# Patient Record
Sex: MALE | Race: WHITE | NOT HISPANIC OR LATINO | URBAN - METROPOLITAN AREA
[De-identification: names, ages, dates, MRNs, and addresses within clinical notes are randomized per-mention and may not be internally consistent; named-entity substitution may affect disease eponyms.]

---

## 2018-08-16 VITALS
DIASTOLIC BLOOD PRESSURE: 71 MMHG | DIASTOLIC BLOOD PRESSURE: 76 MMHG | RESPIRATION RATE: 17 BRPM | HEART RATE: 70 BPM | HEART RATE: 67 BPM | RESPIRATION RATE: 14 BRPM | DIASTOLIC BLOOD PRESSURE: 69 MMHG | TEMPERATURE: 98.1 F | RESPIRATION RATE: 16 BRPM | DIASTOLIC BLOOD PRESSURE: 72 MMHG | DIASTOLIC BLOOD PRESSURE: 77 MMHG | WEIGHT: 245 LBS | RESPIRATION RATE: 21 BRPM | HEART RATE: 65 BPM | SYSTOLIC BLOOD PRESSURE: 134 MMHG | OXYGEN SATURATION: 93 % | HEART RATE: 71 BPM | OXYGEN SATURATION: 95 % | SYSTOLIC BLOOD PRESSURE: 107 MMHG | HEART RATE: 68 BPM | SYSTOLIC BLOOD PRESSURE: 111 MMHG | DIASTOLIC BLOOD PRESSURE: 82 MMHG | SYSTOLIC BLOOD PRESSURE: 120 MMHG | SYSTOLIC BLOOD PRESSURE: 150 MMHG | HEART RATE: 66 BPM | OXYGEN SATURATION: 94 % | SYSTOLIC BLOOD PRESSURE: 117 MMHG | HEART RATE: 77 BPM | HEIGHT: 73 IN | SYSTOLIC BLOOD PRESSURE: 123 MMHG | TEMPERATURE: 97.1 F | OXYGEN SATURATION: 96 %

## 2018-08-20 ENCOUNTER — AMBULATORY SURGICAL CENTER (OUTPATIENT)
Dept: URBAN - METROPOLITAN AREA SURGERY 17 | Facility: SURGERY | Age: 63
End: 2018-08-20
Payer: COMMERCIAL

## 2018-08-20 DIAGNOSIS — Z86.010 PERSONAL HISTORY OF COLONIC POLYPS: ICD-10-CM

## 2018-08-20 DIAGNOSIS — K57.30 DIVERTICULOSIS OF LARGE INTESTINE WITHOUT PERFORATION OR ABS: ICD-10-CM

## 2018-08-20 DIAGNOSIS — K64.1 SECOND DEGREE HEMORRHOIDS: ICD-10-CM

## 2018-08-20 PROCEDURE — 45378 DIAGNOSTIC COLONOSCOPY: CPT | Mod: 33 | Performed by: INTERNAL MEDICINE

## 2019-03-12 ENCOUNTER — LAB REQUISITION (OUTPATIENT)
Dept: LAB | Facility: HOSPITAL | Age: 64
End: 2019-03-12

## 2019-03-12 DIAGNOSIS — M67.441 GANGLION OF RIGHT HAND: ICD-10-CM

## 2019-03-12 PROCEDURE — 88305 TISSUE EXAM BY PATHOLOGIST: CPT | Performed by: PLASTIC SURGERY

## 2019-03-13 LAB
CYTO UR: NORMAL
LAB AP CASE REPORT: NORMAL
LAB AP CLINICAL INFORMATION: NORMAL
PATH REPORT.FINAL DX SPEC: NORMAL
PATH REPORT.GROSS SPEC: NORMAL

## 2022-06-21 ENCOUNTER — OFFICE VISIT (OUTPATIENT)
Dept: ORTHOPEDIC SURGERY | Facility: CLINIC | Age: 67
End: 2022-06-21

## 2022-06-21 VITALS — BODY MASS INDEX: 35.97 KG/M2 | WEIGHT: 265.6 LBS | TEMPERATURE: 97.5 F | HEIGHT: 72 IN

## 2022-06-21 DIAGNOSIS — M16.12 PRIMARY OSTEOARTHRITIS OF LEFT HIP: Primary | ICD-10-CM

## 2022-06-21 PROCEDURE — 99204 OFFICE O/P NEW MOD 45 MIN: CPT | Performed by: NURSE PRACTITIONER

## 2022-06-21 PROCEDURE — 73501 X-RAY EXAM HIP UNI 1 VIEW: CPT | Performed by: NURSE PRACTITIONER

## 2022-06-21 RX ORDER — RANITIDINE 150 MG/1
150 TABLET ORAL
COMMUNITY

## 2022-06-21 RX ORDER — BUPROPION HYDROCHLORIDE 150 MG/1
150 TABLET ORAL EVERY MORNING
COMMUNITY
Start: 2022-06-07

## 2022-06-21 RX ORDER — SODIUM PHOSPHATE,MONO-DIBASIC 19G-7G/118
ENEMA (ML) RECTAL
COMMUNITY

## 2022-06-21 RX ORDER — MELOXICAM 15 MG/1
TABLET ORAL
COMMUNITY
Start: 2022-06-11

## 2022-06-21 RX ORDER — ESCITALOPRAM OXALATE 20 MG/1
20 TABLET ORAL DAILY
COMMUNITY
Start: 2022-04-27

## 2022-06-21 RX ORDER — OXYCODONE AND ACETAMINOPHEN 10; 325 MG/1; MG/1
1 TABLET ORAL EVERY 6 HOURS PRN
COMMUNITY
Start: 2022-06-06

## 2022-06-21 RX ORDER — BUDESONIDE AND FORMOTEROL FUMARATE DIHYDRATE 160; 4.5 UG/1; UG/1
2 AEROSOL RESPIRATORY (INHALATION) 2 TIMES DAILY
COMMUNITY
Start: 2022-05-20

## 2022-06-21 RX ORDER — CYCLOBENZAPRINE HCL 10 MG
TABLET ORAL
COMMUNITY
Start: 2022-03-27

## 2022-06-21 RX ORDER — ALPRAZOLAM 1 MG/1
TABLET ORAL
COMMUNITY
Start: 2022-06-19

## 2022-06-21 RX ORDER — TERBINAFINE HYDROCHLORIDE 250 MG/1
250 TABLET ORAL DAILY
COMMUNITY
Start: 2022-03-16

## 2022-06-21 RX ORDER — CETIRIZINE HYDROCHLORIDE 10 MG/1
10 TABLET ORAL DAILY
COMMUNITY

## 2022-06-21 NOTE — PROGRESS NOTES
Patient: Mario Osullivan Jr.  YOB: 1955 66 y.o. male  Medical Record Number: [id number]    Chief Complaints:   Chief Complaint   Patient presents with   • Left Hip - Initial Evaluation       History of Present Illness:Mario Osullivan Jr. is a 66 y.o. male who presents with complaints of left hip pain.  Patient was seen previously by Dr. Alexandre approximately 5 years ago.  At that time he had pain in his left hip, some moderate arthritic changes and was sent for a left hip fluoroscopy guided cortisone injection.  That actually helped significantly and he only started with increased pain about 4 to 5 months ago.  He denies any injury.  He thinks he may be walking with a limp.  He contacted his medical doctor and they started him meloxicam which has seemed to help somewhat.  Pain is primarily left groin and he is having a difficult time getting shoes and socks on.    Allergies: No Known Allergies    Medications:   Current Outpatient Medications   Medication Sig Dispense Refill   • ALPRAZolam (XANAX) 1 MG tablet      • buPROPion XL (WELLBUTRIN XL) 150 MG 24 hr tablet Take 150 mg by mouth Every Morning.     • cyclobenzaprine (FLEXERIL) 10 MG tablet TAKE 1 TABLET BY MOUTH TWICE A DAY AS NEEDED FOR SPASMS     • glucosamine-chondroitin 500-400 MG capsule capsule Take  by mouth 3 (Three) Times a Day With Meals.     • meloxicam (MOBIC) 15 MG tablet TAKE 1/2 TO 1 TABLET BY MOUTH DAILY AS NEEDED FOR PAIN     • oxyCODONE-acetaminophen (PERCOCET)  MG per tablet Take 1 tablet by mouth Every 6 (Six) Hours As Needed. for pain     • cetirizine (zyrTEC) 10 MG tablet Take 10 mg by mouth Daily.     • escitalopram (LEXAPRO) 20 MG tablet Take 20 mg by mouth Daily.     • raNITIdine (ZANTAC) 150 MG tablet Take 150 mg by mouth.     • Symbicort 160-4.5 MCG/ACT inhaler Inhale 2 puffs 2 (Two) Times a Day.     • terbinafine (lamiSIL) 250 MG tablet Take 250 mg by mouth Daily.       No current facility-administered medications  "for this visit.         The following portions of the patient's history were reviewed and updated as appropriate: allergies, current medications, past family history, past medical history, past social history, past surgical history and problem list.    Review of Systems:   A 14 point review of systems was performed. All systems negative except pertinent positives/negative listed in HPI above    Physical Exam:   Vitals:    06/21/22 0854   Temp: 97.5 °F (36.4 °C)   TempSrc: Temporal   Weight: 120 kg (265 lb 9.6 oz)   Height: 182.9 cm (72\")       General: A and O x 3, ASA, NAD    SCLERA:    Normal    Skin clear no unusual lesions noted  Left hip patient is nontender palpation he has pain with internal and external rotation with a positive Stinchfield positive logroll calf is soft and nontender       Radiology:  Xrays 2 views of left hip ordered and reviewed today secondary to pain and show bone-on-bone end-stage osteoarthritis with cyst and spur formation.  Compared to views show definite progression in arthritic changes    Assessment/Plan: End-stage osteoarthritis left hip with increasing pain    Patient discussed options, Dr. Alexandre also saw the patient.  We will proceed with a left hip fluoroscopy guided cortisone injection to see if that helps provide some relief, continue meloxicam, and the patient will follow-up with me if the pain does not improve or if it worsens.  At some point he most likely will want to proceed with total hip replacement we will start with the injection first      Dai Dotson, APRN  6/21/2022  "

## 2022-07-06 ENCOUNTER — HOSPITAL ENCOUNTER (OUTPATIENT)
Dept: GENERAL RADIOLOGY | Facility: HOSPITAL | Age: 67
Discharge: HOME | End: 2022-07-06
Admitting: NURSE PRACTITIONER

## 2022-07-06 DIAGNOSIS — M16.12 PRIMARY OSTEOARTHRITIS OF LEFT HIP: ICD-10-CM

## 2022-07-06 PROCEDURE — 77002 NEEDLE LOCALIZATION BY XRAY: CPT

## 2022-07-06 PROCEDURE — 25010000002 IOPAMIDOL 61 % SOLUTION: Performed by: RADIOLOGY

## 2022-07-06 PROCEDURE — 0 LIDOCAINE 1 % SOLUTION: Performed by: RADIOLOGY

## 2022-07-06 PROCEDURE — 25010000002 METHYLPREDNISOLONE PER 40 MG: Performed by: RADIOLOGY

## 2022-07-06 RX ORDER — METHYLPREDNISOLONE ACETATE 40 MG/ML
40 INJECTION, SUSPENSION INTRA-ARTICULAR; INTRALESIONAL; INTRAMUSCULAR; SOFT TISSUE ONCE
Status: COMPLETED | OUTPATIENT
Start: 2022-07-06 | End: 2022-07-06

## 2022-07-06 RX ORDER — BUPIVACAINE HYDROCHLORIDE 2.5 MG/ML
10 INJECTION, SOLUTION EPIDURAL; INFILTRATION; INTRACAUDAL ONCE
Status: COMPLETED | OUTPATIENT
Start: 2022-07-06 | End: 2022-07-06

## 2022-07-06 RX ORDER — LIDOCAINE HYDROCHLORIDE 10 MG/ML
10 INJECTION, SOLUTION INFILTRATION; PERINEURAL ONCE
Status: COMPLETED | OUTPATIENT
Start: 2022-07-06 | End: 2022-07-06

## 2022-07-06 RX ADMIN — IOPAMIDOL 1 ML: 612 INJECTION, SOLUTION INTRATHECAL at 13:35

## 2022-07-06 RX ADMIN — METHYLPREDNISOLONE ACETATE 80 MG: 40 INJECTION, SUSPENSION INTRA-ARTICULAR; INTRALESIONAL; INTRAMUSCULAR; SOFT TISSUE at 13:35

## 2022-07-06 RX ADMIN — LIDOCAINE HYDROCHLORIDE 3 ML: 10 INJECTION, SOLUTION INFILTRATION; PERINEURAL at 13:35

## 2022-07-06 RX ADMIN — BUPIVACAINE HYDROCHLORIDE 5 ML: 2.5 INJECTION, SOLUTION EPIDURAL; INFILTRATION; INTRACAUDAL; PERINEURAL at 13:35

## 2023-02-07 ENCOUNTER — OFFICE VISIT (OUTPATIENT)
Dept: ORTHOPEDIC SURGERY | Facility: CLINIC | Age: 68
End: 2023-02-07
Payer: MEDICARE

## 2023-02-07 VITALS — WEIGHT: 259.5 LBS | BODY MASS INDEX: 35.15 KG/M2 | HEIGHT: 72 IN | TEMPERATURE: 96.9 F

## 2023-02-07 DIAGNOSIS — M16.12 PRIMARY OSTEOARTHRITIS OF LEFT HIP: Primary | ICD-10-CM

## 2023-02-07 PROCEDURE — 99213 OFFICE O/P EST LOW 20 MIN: CPT | Performed by: ORTHOPAEDIC SURGERY

## 2023-02-07 NOTE — PROGRESS NOTES
Patient: Mario Osullivan Jr.  YOB: 1955 67 y.o. male  Medical Record Number: [id number]    Chief Complaint:   Chief Complaint   Patient presents with   • Left Hip - Follow-up       History of Present Illness:Mario Osullivan Jr. is a 67 y.o. male who presents for follow-up of intermittent left hip pain he has an ache within the groin he still is fairly active but there are certain positions or times when the hip causes a sharp stabbing pain with a general baseline ache all the time.  Overall its been relatively stable in comparison to 6 months ago    Allergies: No Known Allergies    Medications:   Current Outpatient Medications   Medication Sig Dispense Refill   • ALPRAZolam (XANAX) 1 MG tablet      • buPROPion XL (WELLBUTRIN XL) 150 MG 24 hr tablet Take 150 mg by mouth Every Morning.     • cyclobenzaprine (FLEXERIL) 10 MG tablet TAKE 1 TABLET BY MOUTH TWICE A DAY AS NEEDED FOR SPASMS     • meloxicam (MOBIC) 15 MG tablet TAKE 1/2 TO 1 TABLET BY MOUTH DAILY AS NEEDED FOR PAIN     • oxyCODONE-acetaminophen (PERCOCET)  MG per tablet Take 1 tablet by mouth Every 6 (Six) Hours As Needed. for pain     • raNITIdine (ZANTAC) 150 MG tablet Take 150 mg by mouth.     • cetirizine (zyrTEC) 10 MG tablet Take 10 mg by mouth Daily.     • escitalopram (LEXAPRO) 20 MG tablet Take 20 mg by mouth Daily.     • glucosamine-chondroitin 500-400 MG capsule capsule Take  by mouth 3 (Three) Times a Day With Meals.     • Symbicort 160-4.5 MCG/ACT inhaler Inhale 2 puffs 2 (Two) Times a Day.     • terbinafine (lamiSIL) 250 MG tablet Take 250 mg by mouth Daily.       No current facility-administered medications for this visit.         The following portions of the patient's history were reviewed and updated as appropriate: allergies, current medications, past family history, past medical history, past social history, past surgical history and problem list.    Review of Systems:   A 14 point review of systems was performed. All  "systems negative except pertinent positives/negative listed in HPI above    Physical Exam:   Vitals:    02/07/23 0753   Temp: 96.9 °F (36.1 °C)   Weight: 118 kg (259 lb 8 oz)   Height: 182.9 cm (72\")       General: A and O x 3, ASA, NAD    SCLERA:    Normal    DENTITION:   Normal  Hip:  left    LEG ALIGNMENT:     Neutral   ,    equal leg lengths    GAIT:     Nonantalgic    SKIN:     No abnormality    RANGE OF MOTION:      Full without joint irritability    STRENGTH:     5 / 5    hip flexion and abduction    DISTAL PULSES:    Paplable    DISTAL SENSATION :   Intact    LYMPHATICS:     No   lymphadenopathy    OTHER:          - Negative Stinchfeld test      - Negative log roll      - No Tenderness to palpation trochanteric bursa      - Neg FADIR      - Neg CHYNA      - No SI tenderness     Radiology:    Xrays 2views left hip (AP bilateral hips and lateral hip) taken previously demonstrating moderate joint space narrowing with near bone-on-bone articulation and marginal osteophyte formation      Assessment/Plan:  Left hip osteoarthritis relatively stable versus previous exam.  At this point we will continue close observation.  I would not recommend any further injections given his failure to respond to previous injections he will come back in 6 months with repeat x-rays sooner should his pain worsen.  We did have some discussions about treatment which would be hip replacement I discussed the procedure in detail expected recovery.  At this point he is not ready for that.      Hussain Alexandre MD  2/7/2023  "

## 2023-08-08 ENCOUNTER — OFFICE VISIT (OUTPATIENT)
Dept: ORTHOPEDIC SURGERY | Facility: CLINIC | Age: 68
End: 2023-08-08
Payer: MEDICARE

## 2023-08-08 VITALS — WEIGHT: 264.2 LBS | BODY MASS INDEX: 35.78 KG/M2 | TEMPERATURE: 98.9 F | HEIGHT: 72 IN

## 2023-08-08 DIAGNOSIS — R52 PAIN: Primary | ICD-10-CM

## 2023-08-08 PROCEDURE — 99212 OFFICE O/P EST SF 10 MIN: CPT | Performed by: ORTHOPAEDIC SURGERY

## 2024-06-11 ENCOUNTER — OFFICE VISIT (OUTPATIENT)
Dept: ORTHOPEDIC SURGERY | Facility: CLINIC | Age: 69
End: 2024-06-11
Payer: MEDICARE

## 2024-06-11 VITALS — HEIGHT: 73 IN | WEIGHT: 252.4 LBS | TEMPERATURE: 98.7 F | BODY MASS INDEX: 33.45 KG/M2

## 2024-06-11 DIAGNOSIS — M16.12 PRIMARY OSTEOARTHRITIS OF LEFT HIP: Primary | ICD-10-CM

## 2024-06-11 PROCEDURE — 99213 OFFICE O/P EST LOW 20 MIN: CPT | Performed by: NURSE PRACTITIONER

## 2024-06-11 NOTE — PROGRESS NOTES
Patient: Mario Osullivan Jr.  YOB: 1955 68 y.o. male  Medical Record Number: [id number]    Chief Complaint:   Chief Complaint   Patient presents with    Left Hip - Follow-up       History of Present Illness:Mario Osullivan Jr. is a 68 y.o. male who presents for follow-up of left hip pain that is chronic in nature.  Patient has known advanced bone-on-bone hip arthritis that we have been closely monitoring.  Patient was last seen approximately 6 months ago.  He states overall he had been managing his symptoms well until about 3 weeks ago when he had a acute flareup while at work.  Reports his pain got pretty severe and limited his ability to function.  States as of today he has returned back to his baseline.  He denied any injury during this time.  Reports that he takes meloxicam daily for his pain.  Patient reports previously having 2 fluoroscopy guided intra-articular joint injections were the first 1 helped and the second 1 only lasted 2 weeks.  Patient states that he is here today for reevaluation.    Allergies: No Known Allergies    Medications:   Current Outpatient Medications   Medication Sig Dispense Refill    ALPRAZolam (XANAX) 1 MG tablet       buPROPion XL (WELLBUTRIN XL) 150 MG 24 hr tablet Take 1 tablet by mouth Every Morning.      cetirizine (zyrTEC) 10 MG tablet Take 1 tablet by mouth Daily.      cyclobenzaprine (FLEXERIL) 10 MG tablet       escitalopram (LEXAPRO) 20 MG tablet Take 1 tablet by mouth Daily.      glucosamine-chondroitin 500-400 MG capsule capsule Take  by mouth 3 (Three) Times a Day With Meals.      meloxicam (MOBIC) 15 MG tablet TAKE 1/2 TO 1 TABLET BY MOUTH DAILY AS NEEDED FOR PAIN      oxyCODONE-acetaminophen (PERCOCET)  MG per tablet Take 1 tablet by mouth Every 6 (Six) Hours As Needed. for pain      raNITIdine (ZANTAC) 150 MG tablet Take 1 tablet by mouth.      Symbicort 160-4.5 MCG/ACT inhaler Inhale 2 puffs 2 (Two) Times a Day.      terbinafine (lamiSIL) 250 MG  "tablet Take 1 tablet by mouth Daily.       No current facility-administered medications for this visit.         The following portions of the patient's history were reviewed and updated as appropriate: allergies, current medications, past family history, past medical history, past social history, past surgical history and problem list.    Review of Systems:   Pertinent positives/negative listed in HPI above    Physical Exam:   Vitals:    06/11/24 0808   Temp: 98.7 °F (37.1 °C)   TempSrc: Temporal   Weight: 114 kg (252 lb 6.4 oz)   Height: 184.2 cm (72.5\")   PainSc:   3   PainLoc: Hip       General: A and O x 3, ASA, NAD      Hip:  left    LEG ALIGNMENT:     Neutral        LEG LENGTH DISCREPANCY   :    none    GAIT:     Antalgic    SKIN:     No abnormality    RANGE OF MOTION: Slightly irritable with internal rotation    STRENGTH:     5/5    DISTAL PULSES:    Paplable    DISTAL SENSATION :   Intact    LYMPHATICS:     No   lymphadenopathy    OTHER:          -   Stinchfeld test      - Scouring      + FADIR      - CHYNA      -    log roll      -   Tenderness to palpation trochanteric bursa      Radiology:    Xrays 2views left hip (AP bilateral hips and lateral hip) taken previously demonstrating advanced, end-satge osteoarthritis with bone on bone articluation, periarticular osteophytes, and subchondral cysts  Comparison views: No new films were taken today for comparison view    Assessment/Plan: Primary osteoarthritis left hip    Treatment options as well as imaging results were discussed in detail with the patient.  We did discuss continuation of conservative versus surgical measures.  Patient reports that he is back at his baseline and symptoms are manageable again.  I have instructed him to continue taking the meloxicam on a daily basis.  If his symptoms flareup again and remain consistent he will likely need to proceed forward with a total hip replacement at that time.  I have instructed him when this occurs he " needs to follow back up with Dr. Alexandre to discuss further surgical details.    Jj Soriano, APRN  6/11/2024

## 2024-11-20 ENCOUNTER — HOSPITAL ENCOUNTER (EMERGENCY)
Facility: HOSPITAL | Age: 69
Discharge: HOME | End: 2024-11-20
Attending: EMERGENCY MEDICINE
Payer: MEDICARE

## 2024-11-20 VITALS
OXYGEN SATURATION: 97 % | HEART RATE: 73 BPM | DIASTOLIC BLOOD PRESSURE: 95 MMHG | TEMPERATURE: 97.7 F | SYSTOLIC BLOOD PRESSURE: 154 MMHG | RESPIRATION RATE: 16 BRPM

## 2024-11-20 DIAGNOSIS — S01.01XA SCALP LACERATION, INITIAL ENCOUNTER: Primary | ICD-10-CM

## 2024-11-20 PROCEDURE — 90471 IMMUNIZATION ADMIN: CPT | Performed by: EMERGENCY MEDICINE

## 2024-11-20 PROCEDURE — 90715 TDAP VACCINE 7 YRS/> IM: CPT | Performed by: EMERGENCY MEDICINE

## 2024-11-20 PROCEDURE — 99283 EMERGENCY DEPT VISIT LOW MDM: CPT

## 2024-11-20 PROCEDURE — 25010000002 LIDOCAINE 1% - EPINEPHRINE 1:100000 1 %-1:100000 SOLUTION: Performed by: EMERGENCY MEDICINE

## 2024-11-20 PROCEDURE — 25010000002 TETANUS-DIPHTH-ACELL PERTUSSIS 5-2.5-18.5 LF-MCG/0.5 SUSPENSION PREFILLED SYRINGE: Performed by: EMERGENCY MEDICINE

## 2024-11-20 RX ORDER — LIDOCAINE HYDROCHLORIDE AND EPINEPHRINE 10; 10 MG/ML; UG/ML
10 INJECTION, SOLUTION INFILTRATION; PERINEURAL ONCE
Status: COMPLETED | OUTPATIENT
Start: 2024-11-20 | End: 2024-11-20

## 2024-11-20 RX ADMIN — TETANUS TOXOID, REDUCED DIPHTHERIA TOXOID AND ACELLULAR PERTUSSIS VACCINE, ADSORBED 0.5 ML: 5; 2.5; 8; 8; 2.5 SUSPENSION INTRAMUSCULAR at 13:26

## 2024-11-20 RX ADMIN — LIDOCAINE HYDROCHLORIDE AND EPINEPHRINE 10 ML: 10; 10 INJECTION, SOLUTION INFILTRATION; PERINEURAL at 14:00

## 2024-11-20 NOTE — ED NOTES
"Pt arrived to ER via EMS from home, c/o of laceration on top of head due to accidentally hitting his head on a door frame. Pt denies loc but states \"he saw stars\", denies blood thinners.  "

## 2024-11-20 NOTE — ED PROVIDER NOTES
Laceration Repair    Date/Time: 11/20/2024 2:02 PM    Performed by: Ada Barrios PA-C  Authorized by: Soren Laura MD    Consent:     Consent obtained:  Verbal    Consent given by:  Patient    Risks, benefits, and alternatives were discussed: yes      Risks discussed:  Infection, pain, poor cosmetic result and poor wound healing    Alternatives discussed:  No treatment  Anesthesia:     Anesthesia method:  Local infiltration    Local anesthetic:  Lidocaine 1% WITH epi  Laceration details:     Location:  Scalp    Scalp location:  Crown    Length (cm):  3  Pre-procedure details:     Preparation:  Patient was prepped and draped in usual sterile fashion  Exploration:     Hemostasis achieved with:  Epinephrine and direct pressure    Wound exploration: entire depth of wound visualized      Wound extent: no fascia violation noted, no foreign bodies/material noted, no muscle damage noted, no nerve damage noted, no underlying fracture noted and no vascular damage noted      Contaminated: no    Treatment:     Area cleansed with:  Chlorhexidine    Amount of cleaning:  Standard    Irrigation solution:  Sterile water    Irrigation method:  Syringe  Skin repair:     Repair method:  Staples    Number of staples:  3  Approximation:     Approximation:  Close  Repair type:     Repair type:  Simple  Post-procedure details:     Dressing:  Open (no dressing)    Procedure completion:  Tolerated well, no immediate complications         Ada Barrios PA-C  11/20/24 1403

## 2024-11-20 NOTE — ED PROVIDER NOTES
EMERGENCY DEPARTMENT ENCOUNTER    Room Number:  25/25  PCP: Von Barroso DO  Historian: Patient      HPI:  Chief Complaint: Scalp laceration  A complete HPI/ROS/PMH/PSH/SH/FH are unobtainable due to: None  Context: Mario Osullivan Jr. is a 69 y.o. male who presents to the ED c/o scalp laceration.  Patient states he was renovating a house.  Patient states walked forward and hit his head on low hanging area.  Did not lose consciousness.  Has had no headache.  Has had no fevers or chills.  Has had no vomiting.  No vision changes.  Patient is not on blood thinners.  Has no other injuries.            PAST MEDICAL HISTORY  Active Ambulatory Problems     Diagnosis Date Noted    Hypersomnia      Resolved Ambulatory Problems     Diagnosis Date Noted    No Resolved Ambulatory Problems     No Additional Past Medical History         PAST SURGICAL HISTORY  Past Surgical History:   Procedure Laterality Date    CERVICAL POLYPECTOMY           FAMILY HISTORY  Family History   Problem Relation Age of Onset    Cancer Mother     Cancer Father          SOCIAL HISTORY  Social History     Socioeconomic History    Marital status:    Tobacco Use    Smoking status: Former     Types: Cigarettes    Smokeless tobacco: Never   Vaping Use    Vaping status: Never Used   Substance and Sexual Activity    Alcohol use: Yes     Comment: rare    Drug use: Never    Sexual activity: Defer         ALLERGIES  Patient has no known allergies.        REVIEW OF SYSTEMS  Review of Systems   Scalp laceration      PHYSICAL EXAM  ED Triage Vitals [11/20/24 1309]   Temp Heart Rate Resp BP SpO2   97.7 °F (36.5 °C) 76 16 154/82 92 %      Temp src Heart Rate Source Patient Position BP Location FiO2 (%)   Oral -- Lying Right arm --       Physical Exam      GENERAL: no acute distress  HENT: nares patent  EYES: no scleral icterus  CV: regular rhythm, normal rate  RESPIRATORY: normal effort  ABDOMEN: soft  MUSCULOSKELETAL: no deformity  NEURO: alert,  moves all extremities, follows commands  PSYCH:  calm, cooperative  SKIN: warm, dry.  2 cm laceration to scalp    Vital signs and nursing notes reviewed.          LAB RESULTS  No results found for this or any previous visit (from the past 24 hours).          RADIOLOGY  No Radiology Exams Resulted Within Past 24 Hours            PROCEDURES  Procedures          MEDICATIONS GIVEN IN ER  Medications   Tetanus-Diphth-Acell Pertussis (BOOSTRIX) injection 0.5 mL (0.5 mL Intramuscular Given 11/20/24 1326)   lidocaine 1% - EPINEPHrine 1:501088 (XYLOCAINE W/EPI) 1 %-1:847533 injection 10 mL (10 mL Injection Given 11/20/24 1400)                   MEDICAL DECISION MAKING, PROGRESS, and CONSULTS    All labs have been independently reviewed by me.  All radiology studies have been reviewed by me and I have also reviewed the radiology report.   EKG's independently viewed and interpreted by me.  Discussion below represents my analysis of pertinent findings related to patient's condition, differential diagnosis, treatment plan and final disposition.      Additional sources:  - Discussed/ obtained information from independent historians: None    - External (non-ED) record review: Epic reviewed patient seen by orthopedist 6/11/2024 for osteoarthritis of left hip     - Chronic or social conditions impacting care: None    - Shared decision making: None      Orders placed during this visit:  Orders Placed This Encounter   Procedures    Laceration Repair         Additional orders considered but not ordered:  None        Differential diagnosis includes but is not limited to:    Laceration versus contusion versus intracranial hemorrhage      Independent interpretation of labs, radiology studies, and discussions with consultants:  ED Course as of 11/20/24 1441   Wed Nov 20, 2024   1405 14:06 EST  Patient presents for evaluation of scalp laceration.  Patient has no signs of head injury.  No loss of conscious.  No nausea or vomiting.  No vision  changes.  Patient's lacerations been repaired.  Patient will be discharged home follow-up with primary provider. [SL]      ED Course User Index  [SL] Soren Laura MD                 DIAGNOSIS  Final diagnoses:   Scalp laceration, initial encounter         DISPOSITION  DISCHARGE    Patient discharged in stable condition.    Reviewed implications of results, diagnosis, meds, responsibility to follow up, warning signs and symptoms of possible worsening, potential complications and reasons to return to ER, including worsening symptoms    Patient/Family voiced understanding of above instructions.    Discussed plan for discharge, as there is no emergent indication for admission. Patient referred to primary care provider for BP management due to today's BP. Pt/family is agreeable and understands need for follow up and repeat testing.  Pt is aware that discharge does not mean that nothing is wrong but it indicates no emergency is present that requires admission and they must continue care with follow-up as given below or physician of their choice.     FOLLOW-UP  Von Barroso,   [address] [place] [address] [address] [place] [address] [phone]    In 1 week  for staple removal         Medication List      No changes were made to your prescriptions during this visit.                  Latest Documented Vital Signs:  As of 14:41 EST  BP- 154/95 HR- 73 Temp- 97.7 °F (36.5 °C) (Oral) O2 sat- 97%              --    Please note that portions of this were completed with a voice recognition program.       Note Disclaimer: At [facility], we believe that sharing information builds trust and better relationships. You are receiving this note because you are receiving care at [facility] or recently visited. It is possible you will see health information before a provider has talked with you about it. This kind of information can be easy to misunderstand. To help you fully understand what it means for your health, we urge you to  discuss this note with your provider.            Soren Laura MD  11/20/24 3250

## 2024-11-20 NOTE — ED NOTES
Pt states that he was walking through short doorway when he ran into the frame. Denies loc or blood thinners. Pt has laceration to top of head. Bleeding controlled. No dizziness or vision changes

## 2024-11-21 ENCOUNTER — OFFICE VISIT (OUTPATIENT)
Dept: ORTHOPEDIC SURGERY | Facility: CLINIC | Age: 69
End: 2024-11-21
Payer: MEDICARE

## 2024-11-21 VITALS — HEIGHT: 72 IN | TEMPERATURE: 98.4 F | BODY MASS INDEX: 34.95 KG/M2 | WEIGHT: 258 LBS

## 2024-11-21 DIAGNOSIS — M16.12 PRIMARY OSTEOARTHRITIS OF LEFT HIP: ICD-10-CM

## 2024-11-21 DIAGNOSIS — R52 PAIN: Primary | ICD-10-CM

## 2024-11-21 PROBLEM — M17.9 OA (OSTEOARTHRITIS) OF KNEE: Status: ACTIVE | Noted: 2024-11-21

## 2024-11-21 PROCEDURE — 99214 OFFICE O/P EST MOD 30 MIN: CPT | Performed by: ORTHOPAEDIC SURGERY

## 2024-11-21 RX ORDER — PREGABALIN 150 MG/1
150 CAPSULE ORAL ONCE
OUTPATIENT
Start: 2024-11-21 | End: 2024-11-21

## 2024-11-21 RX ORDER — CHLORHEXIDINE GLUCONATE 500 MG/1
CLOTH TOPICAL 2 TIMES DAILY
OUTPATIENT
Start: 2024-11-21

## 2024-11-21 NOTE — PROGRESS NOTES
Patient: Mario Osullivan Jr.  YOB: 1955 69 y.o. male  Medical Record Number: [id number]    Chief Complaint:   Chief Complaint   Patient presents with    Left Hip - Follow-up, Pain       History of Present Illness:Mario Osullivan Jr. is a 69 y.o. male who presents for follow-up of left hip pain.  He has had progressive increase in the left hip pain.  He had a couple different fluoroscopy guided injections the second 1 did not help nearly as much as the first.  The pain now limits basic activities of daily living and walking tolerance.  He can only walk short distances due to the pain.  It is not improved with anti-inflammatories.  It has progressively worsened over the last few years despite conservative measures.    Allergies: No Known Allergies    Medications:   Current Outpatient Medications   Medication Sig Dispense Refill    ALPRAZolam (XANAX) 1 MG tablet       buPROPion XL (WELLBUTRIN XL) 150 MG 24 hr tablet Take 1 tablet by mouth Every Morning.      cetirizine (zyrTEC) 10 MG tablet Take 1 tablet by mouth Daily.      meloxicam (MOBIC) 15 MG tablet TAKE 1/2 TO 1 TABLET BY MOUTH DAILY AS NEEDED FOR PAIN      oxyCODONE-acetaminophen (PERCOCET)  MG per tablet Take 1 tablet by mouth Every 6 (Six) Hours As Needed. for pain      raNITIdine (ZANTAC) 150 MG tablet Take 1 tablet by mouth.      terbinafine (lamiSIL) 250 MG tablet Take 1 tablet by mouth Daily.      cyclobenzaprine (FLEXERIL) 10 MG tablet  (Patient not taking: Reported on 11/21/2024)      escitalopram (LEXAPRO) 20 MG tablet Take 1 tablet by mouth Daily. (Patient not taking: Reported on 11/21/2024)      glucosamine-chondroitin 500-400 MG capsule capsule Take  by mouth 3 (Three) Times a Day With Meals. (Patient not taking: Reported on 11/21/2024)      Symbicort 160-4.5 MCG/ACT inhaler Inhale 2 puffs 2 (Two) Times a Day. (Patient not taking: Reported on 11/21/2024)       No current facility-administered medications for this visit.  "        The following portions of the patient's history were reviewed and updated as appropriate: allergies, current medications, past family history, past medical history, past social history, past surgical history and problem list.    Review of Systems:   Pertinent positives/negative listed in HPI above    Physical Exam:   Vitals:    11/21/24 1013   Temp: 98.4 °F (36.9 °C)   TempSrc: Temporal   Weight: 117 kg (258 lb)   Height: 182.9 cm (72\")   PainSc:   4   PainLoc: Hip       General: A and O x 3, ASA, NAD      Hip:  left    LEG ALIGNMENT:     Neutral        LEG LENGTH DISCREPANCY   :    none    GAIT:     Antalgic    SKIN:     No abnormality    RANGE OF MOTION:     Limited by joint irritability    STRENGTH:     Limited by joint irratibility    DISTAL PULSES:    Paplable    DISTAL SENSATION :   Intact    LYMPHATICS:     No   lymphadenopathy    OTHER:          +   Stinchfeld test      -    log roll      -   Tenderness to palpation trochanteric bursa      Radiology:    Xrays 2views left hip  (AP bilateral hips and lateral hip) were ordered and reviewed for evaluation of hip pain demonstrating advanced, end-satge osteoarthritis with bone on bone articluation, periarticular osteophytes, and subchondral cysts  Comparison views: todays xrays were compared to previous xrays and demonstrate no change    Assessment/Plan:  Left hip endstage OA  Continuation of conservative management vs. PAKO discussed.  The patient wishes to proceed with total hip replacement.  At this point the patient has failed the full gamut of conservative treatment and stating complete understanding of the risks/benefits/ anternatives wishes to proceed with surgical treatment.    Risk and benefits of surgery were reviewed.  Including, but not limited to, blood clots, anesthesia risk, infection, leg length discrepancy, fracture, skin/leg numbness, failure of the implant, need for future surgeries, continued pain, hematoma, need for transfusion, and " death, among others.  The patient understands and wishes to proceed.     The spectrum of treatment options were discussed with the patient in detail including both the nonoperative and operative treatment modalities and their respective risks and benefits.  After thorough discussion, the patient has elected to undergo surgical treatment.  The details of the surgical procedure were explained including the location of probable incisions and a description of the likely implants to be used.  Models and diagrams were used as educational resources. The patient understands the likely convalescence after surgery, as well as the rehabilitation required.  We thoroughly discussed the risks, benefits, and alternatives to surgery.  The risks include but are not limited to the risk of infection, joint stiffness, blood clots (including DVT and/or pulmonary embolus along with the risk of death), neurologic and/or vascular injury, fracture, dislocation, nonunion, malunion, need for further surgery including hardware failure requiring revision, and continued pain.  It was explained that if tissue has been repaired or reconstructed, there is also a chance of failure which may require further management.  Following the completion of the discussion, the patient expressed understanding of this planned course of care, all their questions were answered and consent will be obtained preoperatively.    Operative Plan: Anterior approach Total Hip Replacement - Outpatient     Diagnoses and all orders for this visit:    1. Pain (Primary)  -     XR Hip With or Without Pelvis 2 - 3 View Left        Hussain Alexandre MD  11/21/2024

## 2025-02-03 ENCOUNTER — PRE-ADMISSION TESTING (OUTPATIENT)
Dept: PREADMISSION TESTING | Facility: HOSPITAL | Age: 70
End: 2025-02-03
Payer: MEDICARE

## 2025-02-03 VITALS
SYSTOLIC BLOOD PRESSURE: 140 MMHG | DIASTOLIC BLOOD PRESSURE: 79 MMHG | RESPIRATION RATE: 20 BRPM | BODY MASS INDEX: 34.54 KG/M2 | WEIGHT: 255 LBS | TEMPERATURE: 97.6 F | HEART RATE: 73 BPM | OXYGEN SATURATION: 95 % | HEIGHT: 72 IN

## 2025-02-03 LAB
ABO GROUP BLD: NORMAL
ANION GAP SERPL CALCULATED.3IONS-SCNC: 11 MMOL/L (ref 5–15)
BLD GP AB SCN SERPL QL: NEGATIVE
BUN SERPL-MCNC: 29 MG/DL (ref 8–23)
BUN/CREAT SERPL: 30.5 (ref 7–25)
CALCIUM SPEC-SCNC: 8.6 MG/DL (ref 8.6–10.5)
CHLORIDE SERPL-SCNC: 104 MMOL/L (ref 98–107)
CO2 SERPL-SCNC: 23 MMOL/L (ref 22–29)
CREAT SERPL-MCNC: 0.95 MG/DL (ref 0.76–1.27)
DEPRECATED RDW RBC AUTO: 42.8 FL (ref 37–54)
EGFRCR SERPLBLD CKD-EPI 2021: 86.6 ML/MIN/1.73
ERYTHROCYTE [DISTWIDTH] IN BLOOD BY AUTOMATED COUNT: 13.5 % (ref 12.3–15.4)
GLUCOSE SERPL-MCNC: 113 MG/DL (ref 65–99)
HCT VFR BLD AUTO: 47.9 % (ref 37.5–51)
HGB BLD-MCNC: 16.1 G/DL (ref 13–17.7)
MCH RBC QN AUTO: 29.5 PG (ref 26.6–33)
MCHC RBC AUTO-ENTMCNC: 33.6 G/DL (ref 31.5–35.7)
MCV RBC AUTO: 87.7 FL (ref 79–97)
PLATELET # BLD AUTO: 258 10*3/MM3 (ref 140–450)
PMV BLD AUTO: 8.7 FL (ref 6–12)
POTASSIUM SERPL-SCNC: 4.1 MMOL/L (ref 3.5–5.2)
QT INTERVAL: 405 MS
QTC INTERVAL: 434 MS
RBC # BLD AUTO: 5.46 10*6/MM3 (ref 4.14–5.8)
RH BLD: NEGATIVE
SODIUM SERPL-SCNC: 138 MMOL/L (ref 136–145)
T&S EXPIRATION DATE: NORMAL
WBC NRBC COR # BLD AUTO: 13.48 10*3/MM3 (ref 3.4–10.8)

## 2025-02-03 PROCEDURE — 86900 BLOOD TYPING SEROLOGIC ABO: CPT | Performed by: ORTHOPAEDIC SURGERY

## 2025-02-03 PROCEDURE — 80048 BASIC METABOLIC PNL TOTAL CA: CPT

## 2025-02-03 PROCEDURE — 86850 RBC ANTIBODY SCREEN: CPT | Performed by: ORTHOPAEDIC SURGERY

## 2025-02-03 PROCEDURE — 36415 COLL VENOUS BLD VENIPUNCTURE: CPT

## 2025-02-03 PROCEDURE — 93005 ELECTROCARDIOGRAM TRACING: CPT

## 2025-02-03 PROCEDURE — 86901 BLOOD TYPING SEROLOGIC RH(D): CPT | Performed by: ORTHOPAEDIC SURGERY

## 2025-02-03 PROCEDURE — 85027 COMPLETE CBC AUTOMATED: CPT

## 2025-02-03 RX ORDER — METHYLPREDNISOLONE 4 MG/1
1 TABLET ORAL 3 TIMES DAILY
COMMUNITY

## 2025-02-03 RX ORDER — CLARITHROMYCIN 500 MG/1
1 TABLET ORAL EVERY 12 HOURS SCHEDULED
COMMUNITY
Start: 2025-01-27

## 2025-02-03 NOTE — DISCHARGE INSTRUCTIONS
Take the following medications the morning of surgery:    WELLBUTRIN, XANAX    If you are on prescription narcotic pain medication to control your pain you may also take that medication the morning of surgery.      General Instructions:     Do not eat solid food after midnight the night before surgery.  Clear liquids day of surgery are allowed but must be stopped at least two hours before your hospital arrival time.       Allowed clear liquids      Water, sodas, and tea or coffee with no cream or milk added.       12 to 20 ounces of a clear liquid that contains carbohydrates is recommended.  If non-diabetic, have Gatorade or Powerade.  If diabetic, have G2 or Powerade Zero.     Do not have liquids red in color.  Do not consume chicken, beef, pork or vegetable broth or bouillon cubes of any variety as they are not considered clear liquids and are not allowed.      Infants may have breast milk up to four hours before surgery.  Infants drinking formula may drink formula up to six hours before surgery.   Patients who avoid smoking, chewing tobacco and alcohol for 4 weeks prior to surgery have a reduced risk of post-operative complications.  Quit smoking as many days before surgery as you can.  Do not smoke, use chewing tobacco or drink alcohol the day of surgery.   If applicable bring your C-PAP/ BI-PAP machine in with you to preop day of surgery.  Bring any papers given to you in the doctor’s office.  Wear clean comfortable clothes.  Do not wear contact lenses, false eyelashes or make-up.  Bring a case for your glasses.   Bring crutches or walker if applicable.  Remove all piercings.  Leave jewelry and any other valuables at home.  Hair extensions with metal clips must be removed prior to surgery.  The Pre-Admission Testing nurse will instruct you to bring medications if unable to obtain an accurate list in Pre-Admission Testing.        If you were given a blood bank ID arm band remember to bring it with you the day of  surgery.    Preventing a Surgical Site Infection:  For 2 to 3 days before surgery, avoid shaving with a razor because the razor can irritate skin and make it easier to develop an infection.    Any areas of open skin can increase the risk of a post-operative wound infection by allowing bacteria to enter and travel throughout the body.  Notify your surgeon if you have any skin wounds / rashes even if it is not near the expected surgical site.  The area will need assessed to determine if surgery should be delayed until it is healed.  The night prior to surgery shower using a fresh bar of anti-bacterial soap (such as Dial) and clean washcloth.  Sleep in a clean bed with clean clothing.  Do not allow pets to sleep with you.  Shower on the morning of surgery using a fresh bar of anti-bacterial soap (such as Dial) and clean washcloth.  Dry with a clean towel and dress in clean clothing.  Ask your surgeon if you will be receiving antibiotics prior to surgery.  Make sure you, your family, and all healthcare providers clean their hands with soap and water or an alcohol based hand  before caring for you or your wound.    Day of surgery:  Your arrival time is approximately two hours before your scheduled surgery time.  Please note if you have an early arrival time the surgery doors do not open before 5:00 AM.  Upon arrival, a Pre-op nurse and Anesthesiologist will review your health history, obtain vital signs, and answer questions you may have.  The only belongings needed at this time will be a list of your home medications and if applicable your C-PAP/BI-PAP machine.  A Pre-op nurse will start an IV and you may receive medication in preparation for surgery, including something to help you relax.     Please be aware that surgery does come with discomfort.  We want to make every effort to control your discomfort so please discuss any uncontrolled symptoms with your nurse.   Your doctor will most likely have prescribed  pain medications.      If you are going home after surgery you will receive individualized written care instructions before being discharged.  A responsible adult must drive you to and from the hospital on the day of your surgery and ideally stay with you through the night.   .  Discharge prescriptions can be filled by the hospital pharmacy during regular pharmacy hours.  If you are having surgery late in the day/evening your prescription may be e-prescribed to your pharmacy.  Please verify your pharmacy hours or chose a 24 hour pharmacy to avoid not having access to your prescription because your pharmacy has closed for the day.    If you are staying overnight following surgery, you will be transported to your hospital room following the recovery period.  [facility] has all private rooms.    If you have any questions please call Pre-Admission Testing at [phone].  Deductibles and co-payments are collected on the day of service. Please be prepared to pay the required co-pay, deductible or deposit on the day of service as defined by your plan.    Call your surgeon immediately if you experience any of the following symptoms:  Sore Throat  Shortness of Breath or difficulty breathing  Cough  Chills  Body soreness or muscle pain  Headache  Fever  New loss of taste or smell  Do not arrive for your surgery ill.  Your procedure will need to be rescheduled to another time.  You will need to call your physician before the day of surgery to avoid any unnecessary exposure to hospital staff as well as other patients.      CHLORHEXIDINE CLOTH INSTRUCTIONS  The morning of surgery follow these instructions using the Chlorhexidine cloths you've been given.  These steps reduce bacteria on the body.  Do not use the cloths near your eyes, ears mouth, genitalia or on open wounds.  Throw the cloths away after use but do not try to flush them down a toilet.      Open and remove one cloth at a time from the package.     Leave the cloth unfolded and begin the bathing.  Massage the skin with the cloths using gentle pressure to remove bacteria.  Do not scrub harshly.   Follow the steps below with one 2% CHG cloth per area (6 total cloths).  One cloth for neck, shoulders and chest.  One cloth for both arms, hands, fingers and underarms (do underarms last).  One cloth for the abdomen followed by groin.  One cloth for right leg and foot including between the toes.  One cloth for left leg and foot including between the toes.  The last cloth is to be used for the back of the neck, back and buttocks.    Allow the CHG to air dry 3 minutes on the skin which will give it time to work and decrease the chance of irritation.  The skin may feel sticky until it is dry.  Do not rinse with water or any other liquid or you will lose the beneficial effects of the CHG.  If mild skin irritation occurs, do rinse the skin to remove the CHG.  Report this to the nurse at time of admission.  Do not apply lotions, creams, ointments, deodorants or perfumes after using the clothes. Dress in clean clothes before coming to the hospital.

## 2025-02-06 ENCOUNTER — OFFICE VISIT (OUTPATIENT)
Dept: ORTHOPEDIC SURGERY | Facility: CLINIC | Age: 70
End: 2025-02-06
Payer: MEDICARE

## 2025-02-06 VITALS
BODY MASS INDEX: 35.35 KG/M2 | WEIGHT: 261 LBS | TEMPERATURE: 98 F | HEIGHT: 72 IN | SYSTOLIC BLOOD PRESSURE: 152 MMHG | DIASTOLIC BLOOD PRESSURE: 77 MMHG

## 2025-02-06 DIAGNOSIS — M16.12 PRIMARY OSTEOARTHRITIS OF LEFT HIP: Primary | ICD-10-CM

## 2025-02-06 PROCEDURE — 1160F RVW MEDS BY RX/DR IN RCRD: CPT | Performed by: NURSE PRACTITIONER

## 2025-02-06 PROCEDURE — 1159F MED LIST DOCD IN RCRD: CPT | Performed by: NURSE PRACTITIONER

## 2025-02-06 PROCEDURE — S0260 H&P FOR SURGERY: HCPCS | Performed by: NURSE PRACTITIONER

## 2025-02-06 NOTE — H&P (VIEW-ONLY)
Patient: Mario Osullivan Jr.    Date of Admission: 2/17/2025    YOB: 1955    Medical Record Number: [id number]    Admitting Physician: Dr. Hussain Alexandre    Reason for Admission: End Stage Left Hip OA    History of Present Illness: 69 y.o. male presents with severe end stage hip osteoarthritis which has not been responsive to the full compliment of conservative measures. Despite conservative attempts, there is still severe, constant activity limiting hip pain. Given the severity of the pain, the patient has elected to proceed with hip replacement.    Allergies: No Known Allergies      Current Medications:  Home Medications:    Current Outpatient Medications on File Prior to Visit   Medication Sig    ALPRAZolam (XANAX) 1 MG tablet Take 1 tablet by mouth 2 (Two) Times a Day.    buPROPion XL (WELLBUTRIN XL) 150 MG 24 hr tablet Take 1 tablet by mouth Every Morning.    meloxicam (MOBIC) 15 MG tablet Take 1 tablet by mouth Daily. HOLD FOR SURGERY    oxyCODONE-acetaminophen (PERCOCET)  MG per tablet Take 1 tablet by mouth Every 6 (Six) Hours As Needed. for pain    raNITIdine (ZANTAC) 150 MG tablet Take 1 tablet by mouth As Needed.    Sildenafil Citrate (VIAGRA PO) Take  by mouth As Needed. HOLD 72 HOURS PRIOR TO SURGERY    clarithromycin (BIAXIN) 500 MG tablet Take 1 tablet by mouth Every 12 (Twelve) Hours. CURRENTLY TAKING FOR BRONCHITIS (Patient not taking: Reported on 2/6/2025)    methylPREDNISolone (MEDROL) 4 MG tablet Take 1 tablet by mouth 3 times a day. CURRENTLY TAKING FOR BRONCHITIS (Patient not taking: Reported on 2/6/2025)     No current facility-administered medications on file prior to visit.     PRN Meds:.    PMH:  Past Medical History:   Diagnosis Date    Anxiety and depression     Bronchitis 02/03/2025    RECENTLY TREATED FOR THIS    GERD (gastroesophageal reflux disease)     History of sepsis 2010    OA (osteoarthritis) of hip     LEFT:  PAIN, LIMITED MOBILITY    Sleep apnea     CPAP       "  PSURGH:  Past Surgical History:   Procedure Laterality Date    COLONOSCOPY      CYST REMOVAL      STATES SEVERAL NON CANCEROUS \"TUMORS\" REMOVED    ENDOSCOPY      HEMORRHOIDECTOMY      LASIK Bilateral     PROSTATE BIOPSY         SocialHx:  Social History     Occupational History    Not on file   Tobacco Use    Smoking status: Former     Types: Cigarettes     Passive exposure: Past    Smokeless tobacco: Never    Tobacco comments:     QUIT 40 YEARS AGO   Vaping Use    Vaping status: Never Used   Substance and Sexual Activity    Alcohol use: Yes     Comment: rare    Drug use: Never    Sexual activity: Defer      Social History     Social History Narrative    Not on file       FamHx:  Family History   Problem Relation Age of Onset    Cancer Mother     Cancer Father     Malig Hyperthermia Neg Hx          Review of Systems:   A 14 point review of systems was performed, pertinent positives discussed above, all other systems are negative    Physical Exam: 69 y.o. male  Vital Signs :   Vitals:    02/06/25 1617   BP: 152/77   Temp: 98 °F (36.7 °C)   Weight: 118 kg (261 lb)   Height: 182.9 cm (72\")   PainSc:   5   PainLoc: Hip     General: Alert and Oriented x 3, No acute distress.  Psych: mood and affect appropriate; recent and remote memory intact  Eyes: conjunctivae clear; pupils equally round and reactive, sclerae antiicteric  CV: no peripheral edema  Resp: normal respiratory effort  Skin: no rashes or wounds; normal turgor  Musculosketetal; pain with hip range of motion. Positive Stinchfeld test. No trochanteric tenderness.  Vascular: palpable distal pulses    Labs:    Pre-Admission Testing on 02/03/2025   Component Date Value Ref Range Status    ABO Type 02/03/2025 O   Final    RH type 02/03/2025 Negative   Final    Antibody Screen 02/03/2025 Negative   Final    T&S Expiration Date 02/03/2025 2/17/2025 11:59:00 PM   Final    Glucose 02/03/2025 113 (H)  65 - 99 mg/dL Final    BUN 02/03/2025 29 (H)  8 - 23 mg/dL Final    " Creatinine 02/03/2025 0.95  0.76 - 1.27 mg/dL Final    Sodium 02/03/2025 138  136 - 145 mmol/L Final    Potassium 02/03/2025 4.1  3.5 - 5.2 mmol/L Final    Chloride 02/03/2025 104  98 - 107 mmol/L Final    CO2 02/03/2025 23.0  22.0 - 29.0 mmol/L Final    Calcium 02/03/2025 8.6  8.6 - 10.5 mg/dL Final    BUN/Creatinine Ratio 02/03/2025 30.5 (H)  7.0 - 25.0 Final    Anion Gap 02/03/2025 11.0  5.0 - 15.0 mmol/L Final    eGFR 02/03/2025 86.6  >60.0 mL/min/1.73 Final    WBC 02/03/2025 13.48 (H)  3.40 - 10.80 10*3/mm3 Final    RBC 02/03/2025 5.46  4.14 - 5.80 10*6/mm3 Final    Hemoglobin 02/03/2025 16.1  13.0 - 17.7 g/dL Final    Hematocrit 02/03/2025 47.9  37.5 - 51.0 % Final    MCV 02/03/2025 87.7  79.0 - 97.0 fL Final    MCH 02/03/2025 29.5  26.6 - 33.0 pg Final    MCHC 02/03/2025 33.6  31.5 - 35.7 g/dL Final    RDW 02/03/2025 13.5  12.3 - 15.4 % Final    RDW-SD 02/03/2025 42.8  37.0 - 54.0 fl Final    MPV 02/03/2025 8.7  6.0 - 12.0 fL Final    Platelets 02/03/2025 258  140 - 450 10*3/mm3 Final    QT Interval 02/03/2025 405  ms Final    QTC Interval 02/03/2025 434  ms Final     Xrays:  Xrays AP pelvis and a lateral of the Left hip were reviewed demonstrating  End stage hip OA with bone on bone articulation, subchondral cysts and periarticular osteophytes.    Assessment:  End-stage Left hip osteoarthritis. Conservative measures have failed.      Plan:  The plan is to proceed with Left Total Hip Replacement. The patient voiced understanding of the risks, benefits, and alternative forms of treatment that were discussed with Dr Alexandre at the time of scheduling.  Same day home health, antibiotics for 10 days postoperatively because of history of infection, preadmission testing his white blood cell count was slightly elevated but he was being treated for bronchitis, that has completely resolved and he has no further infection at this time    Dai Dotson, APRN  2/6/2025

## 2025-02-06 NOTE — H&P
Patient: Mario Osullivan Jr.    Date of Admission: 2/17/2025    YOB: 1955    Medical Record Number: [id number]    Admitting Physician: Dr. Hussain Alexandre    Reason for Admission: End Stage Left Hip OA    History of Present Illness: 69 y.o. male presents with severe end stage hip osteoarthritis which has not been responsive to the full compliment of conservative measures. Despite conservative attempts, there is still severe, constant activity limiting hip pain. Given the severity of the pain, the patient has elected to proceed with hip replacement.    Allergies: No Known Allergies      Current Medications:  Home Medications:    Current Outpatient Medications on File Prior to Visit   Medication Sig    ALPRAZolam (XANAX) 1 MG tablet Take 1 tablet by mouth 2 (Two) Times a Day.    buPROPion XL (WELLBUTRIN XL) 150 MG 24 hr tablet Take 1 tablet by mouth Every Morning.    meloxicam (MOBIC) 15 MG tablet Take 1 tablet by mouth Daily. HOLD FOR SURGERY    oxyCODONE-acetaminophen (PERCOCET)  MG per tablet Take 1 tablet by mouth Every 6 (Six) Hours As Needed. for pain    raNITIdine (ZANTAC) 150 MG tablet Take 1 tablet by mouth As Needed.    Sildenafil Citrate (VIAGRA PO) Take  by mouth As Needed. HOLD 72 HOURS PRIOR TO SURGERY    clarithromycin (BIAXIN) 500 MG tablet Take 1 tablet by mouth Every 12 (Twelve) Hours. CURRENTLY TAKING FOR BRONCHITIS (Patient not taking: Reported on 2/6/2025)    methylPREDNISolone (MEDROL) 4 MG tablet Take 1 tablet by mouth 3 times a day. CURRENTLY TAKING FOR BRONCHITIS (Patient not taking: Reported on 2/6/2025)     No current facility-administered medications on file prior to visit.     PRN Meds:.    PMH:  Past Medical History:   Diagnosis Date    Anxiety and depression     Bronchitis 02/03/2025    RECENTLY TREATED FOR THIS    GERD (gastroesophageal reflux disease)     History of sepsis 2010    OA (osteoarthritis) of hip     LEFT:  PAIN, LIMITED MOBILITY    Sleep apnea     CPAP       "  PSURGH:  Past Surgical History:   Procedure Laterality Date    COLONOSCOPY      CYST REMOVAL      STATES SEVERAL NON CANCEROUS \"TUMORS\" REMOVED    ENDOSCOPY      HEMORRHOIDECTOMY      LASIK Bilateral     PROSTATE BIOPSY         SocialHx:  Social History     Occupational History    Not on file   Tobacco Use    Smoking status: Former     Types: Cigarettes     Passive exposure: Past    Smokeless tobacco: Never    Tobacco comments:     QUIT 40 YEARS AGO   Vaping Use    Vaping status: Never Used   Substance and Sexual Activity    Alcohol use: Yes     Comment: rare    Drug use: Never    Sexual activity: Defer      Social History     Social History Narrative    Not on file       FamHx:  Family History   Problem Relation Age of Onset    Cancer Mother     Cancer Father     Malig Hyperthermia Neg Hx          Review of Systems:   A 14 point review of systems was performed, pertinent positives discussed above, all other systems are negative    Physical Exam: 69 y.o. male  Vital Signs :   Vitals:    02/06/25 1617   BP: 152/77   Temp: 98 °F (36.7 °C)   Weight: 118 kg (261 lb)   Height: 182.9 cm (72\")   PainSc:   5   PainLoc: Hip     General: Alert and Oriented x 3, No acute distress.  Psych: mood and affect appropriate; recent and remote memory intact  Eyes: conjunctivae clear; pupils equally round and reactive, sclerae antiicteric  CV: no peripheral edema  Resp: normal respiratory effort  Skin: no rashes or wounds; normal turgor  Musculosketetal; pain with hip range of motion. Positive Stinchfeld test. No trochanteric tenderness.  Vascular: palpable distal pulses    Labs:    Pre-Admission Testing on 02/03/2025   Component Date Value Ref Range Status    ABO Type 02/03/2025 O   Final    RH type 02/03/2025 Negative   Final    Antibody Screen 02/03/2025 Negative   Final    T&S Expiration Date 02/03/2025 2/17/2025 11:59:00 PM   Final    Glucose 02/03/2025 113 (H)  65 - 99 mg/dL Final    BUN 02/03/2025 29 (H)  8 - 23 mg/dL Final    " Creatinine 02/03/2025 0.95  0.76 - 1.27 mg/dL Final    Sodium 02/03/2025 138  136 - 145 mmol/L Final    Potassium 02/03/2025 4.1  3.5 - 5.2 mmol/L Final    Chloride 02/03/2025 104  98 - 107 mmol/L Final    CO2 02/03/2025 23.0  22.0 - 29.0 mmol/L Final    Calcium 02/03/2025 8.6  8.6 - 10.5 mg/dL Final    BUN/Creatinine Ratio 02/03/2025 30.5 (H)  7.0 - 25.0 Final    Anion Gap 02/03/2025 11.0  5.0 - 15.0 mmol/L Final    eGFR 02/03/2025 86.6  >60.0 mL/min/1.73 Final    WBC 02/03/2025 13.48 (H)  3.40 - 10.80 10*3/mm3 Final    RBC 02/03/2025 5.46  4.14 - 5.80 10*6/mm3 Final    Hemoglobin 02/03/2025 16.1  13.0 - 17.7 g/dL Final    Hematocrit 02/03/2025 47.9  37.5 - 51.0 % Final    MCV 02/03/2025 87.7  79.0 - 97.0 fL Final    MCH 02/03/2025 29.5  26.6 - 33.0 pg Final    MCHC 02/03/2025 33.6  31.5 - 35.7 g/dL Final    RDW 02/03/2025 13.5  12.3 - 15.4 % Final    RDW-SD 02/03/2025 42.8  37.0 - 54.0 fl Final    MPV 02/03/2025 8.7  6.0 - 12.0 fL Final    Platelets 02/03/2025 258  140 - 450 10*3/mm3 Final    QT Interval 02/03/2025 405  ms Final    QTC Interval 02/03/2025 434  ms Final     Xrays:  Xrays AP pelvis and a lateral of the Left hip were reviewed demonstrating  End stage hip OA with bone on bone articulation, subchondral cysts and periarticular osteophytes.    Assessment:  End-stage Left hip osteoarthritis. Conservative measures have failed.      Plan:  The plan is to proceed with Left Total Hip Replacement. The patient voiced understanding of the risks, benefits, and alternative forms of treatment that were discussed with Dr Alexandre at the time of scheduling.  Same day home health, antibiotics for 10 days postoperatively because of history of infection, preadmission testing his white blood cell count was slightly elevated but he was being treated for bronchitis, that has completely resolved and he has no further infection at this time    Dai Dotson, APRN  2/6/2025

## 2025-02-10 ENCOUNTER — TELEPHONE (OUTPATIENT)
Dept: ORTHOPEDIC SURGERY | Facility: HOSPITAL | Age: 70
End: 2025-02-10
Payer: MEDICARE

## 2025-02-10 NOTE — TELEPHONE ENCOUNTER
Risk Factor yes no   Age >75  X   BMI <20 >40  X   Patient History     Chronic Pain (2 or more meds/Pain Management) X    ETOH (more than 3 drinks Daily)  X   Uncontrolled Depression/Bipolar/Schizoaffective Disorder  X   Arrhythmias--  X   Stent placement/MI  X   DVT/PE  X   Pacemaker  X   HTN (uncontrolled or requiring more than 2 medications)  X   CHF/Retained fluids/Edema  X   Stroke with Residual   X   COPD/Asthma  X   EMMY--Non-compliant with CPAP  X   Diabetes (on insulin or more than 2 meds)         A1C:  X   BPH/Urinary retention (on medication)  X   CKD  X   Home environment and support     Current ambulation status (use of cane, walker, W/C, Multiple falls/weakness)  X   Stairs to enter and throughout home  X   Lives Alone X    Doesn't have support at home  X   Outpatient Screening Assessment    Home needs: (Walker/BSC):  Needs walker  ? Steps 1 step   Does want to practice before leaving   Caregiver 24-48hrs post-discharge: daughter is going to stay with him    Discharge Plan:    PT    Prescriptions: Meds to bed    Home medications:   [] Blood thinner/anti-coag therapy--   [] BPH or diuretic--  [] BP meds--   ? Pain/Anti-inflammatories--Mobic, Percocet    Pre-op Education:  Educate patient on spinal anesthesia/pain control:  ? patient verbalize understanding    Educate patient on hospital course/timeline:  ?  patient verbalize understanding    Joint Care Class:  ?  yes [] no  Notes:   WBC's 13.48

## 2025-02-12 ENCOUNTER — TELEPHONE (OUTPATIENT)
Dept: ORTHOPEDIC SURGERY | Facility: CLINIC | Age: 70
End: 2025-02-12
Payer: MEDICARE

## 2025-02-12 NOTE — TELEPHONE ENCOUNTER
Patient called to report the he woke up in the middle of the night with chills and was running a fever of 101. He hasn't taken any medicine, but is currently fever-free. He states he has a little congestion. Surgery date is 2/17/25

## 2025-02-13 NOTE — TELEPHONE ENCOUNTER
Spoke to patient and relayed message from Dr. Alexandre. Patient stated he has some nasal congestion today, no fever in over 24 hours. I will call him tomorrow for an update on illness.

## 2025-02-14 ENCOUNTER — TELEPHONE (OUTPATIENT)
Dept: ORTHOPEDIC SURGERY | Facility: CLINIC | Age: 70
End: 2025-02-14
Payer: MEDICARE

## 2025-02-14 NOTE — TELEPHONE ENCOUNTER
Patient has been contacted regarding his recent illness.  He is feeling better and has been afebrile for the last 24 hours.  He is requesting stay overnight in the hospital after surgery since his daughter who is planning to take care of him has been recently ill and will not be there on day of surgery.  Has not forwarded to [facility]

## 2025-02-14 NOTE — TELEPHONE ENCOUNTER
Spoke to patient this morning. He reports no fever in over 48 hours. He has some drainage and a cough, no other symptoms. Surgery is scheduled for Monday, 2/17/25.

## 2025-02-17 ENCOUNTER — HOSPITAL ENCOUNTER (OUTPATIENT)
Facility: HOSPITAL | Age: 70
Discharge: HOME HEALTH | End: 2025-02-17
Attending: ORTHOPAEDIC SURGERY | Admitting: ORTHOPAEDIC SURGERY
Payer: MEDICARE

## 2025-02-17 ENCOUNTER — APPOINTMENT (OUTPATIENT)
Dept: GENERAL RADIOLOGY | Facility: HOSPITAL | Age: 70
End: 2025-02-17
Payer: MEDICARE

## 2025-02-17 ENCOUNTER — ANESTHESIA (OUTPATIENT)
Dept: PERIOP | Facility: HOSPITAL | Age: 70
End: 2025-02-17
Payer: MEDICARE

## 2025-02-17 ENCOUNTER — ANESTHESIA EVENT (OUTPATIENT)
Dept: PERIOP | Facility: HOSPITAL | Age: 70
End: 2025-02-17
Payer: MEDICARE

## 2025-02-17 VITALS
RESPIRATION RATE: 18 BRPM | SYSTOLIC BLOOD PRESSURE: 136 MMHG | HEART RATE: 78 BPM | OXYGEN SATURATION: 99 % | DIASTOLIC BLOOD PRESSURE: 79 MMHG | TEMPERATURE: 98 F

## 2025-02-17 DIAGNOSIS — Z96.642 STATUS POST TOTAL HIP REPLACEMENT, LEFT: Primary | ICD-10-CM

## 2025-02-17 DIAGNOSIS — M16.12 PRIMARY OSTEOARTHRITIS OF LEFT HIP: ICD-10-CM

## 2025-02-17 PROCEDURE — 25010000002 MIDAZOLAM PER 1 MG: Performed by: ANESTHESIOLOGY

## 2025-02-17 PROCEDURE — 25010000002 PROPOFOL 10 MG/ML EMULSION

## 2025-02-17 PROCEDURE — C1776 JOINT DEVICE (IMPLANTABLE): HCPCS | Performed by: ORTHOPAEDIC SURGERY

## 2025-02-17 PROCEDURE — 25010000002 SUGAMMADEX 200 MG/2ML SOLUTION

## 2025-02-17 PROCEDURE — 25010000002 ONDANSETRON PER 1 MG

## 2025-02-17 PROCEDURE — 27130 TOTAL HIP ARTHROPLASTY: CPT | Performed by: ORTHOPAEDIC SURGERY

## 2025-02-17 PROCEDURE — 25010000002 FENTANYL CITRATE (PF) 100 MCG/2ML SOLUTION

## 2025-02-17 PROCEDURE — 25010000002 CEFAZOLIN PER 500 MG: Performed by: ORTHOPAEDIC SURGERY

## 2025-02-17 PROCEDURE — 25010000002 ACETAMINOPHEN 10 MG/ML SOLUTION

## 2025-02-17 PROCEDURE — 25010000002 VANCOMYCIN 10 G RECONSTITUTED SOLUTION: Performed by: ORTHOPAEDIC SURGERY

## 2025-02-17 PROCEDURE — 25810000003 LACTATED RINGERS PER 1000 ML: Performed by: ANESTHESIOLOGY

## 2025-02-17 PROCEDURE — 25010000002 MORPHINE PER 10 MG: Performed by: ORTHOPAEDIC SURGERY

## 2025-02-17 PROCEDURE — 25810000003 SODIUM CHLORIDE 0.9 % SOLUTION: Performed by: ORTHOPAEDIC SURGERY

## 2025-02-17 PROCEDURE — 25010000002 LIDOCAINE PF 2% 2 % SOLUTION

## 2025-02-17 PROCEDURE — 25010000002 DEXAMETHASONE SODIUM PHOSPHATE 20 MG/5ML SOLUTION

## 2025-02-17 PROCEDURE — 25010000002 ROPIVACAINE PER 1 MG: Performed by: ORTHOPAEDIC SURGERY

## 2025-02-17 PROCEDURE — 97161 PT EVAL LOW COMPLEX 20 MIN: CPT

## 2025-02-17 PROCEDURE — 76000 FLUOROSCOPY <1 HR PHYS/QHP: CPT

## 2025-02-17 PROCEDURE — 25010000002 EPINEPHRINE 1 MG/ML SOLUTION 30 ML VIAL: Performed by: ORTHOPAEDIC SURGERY

## 2025-02-17 PROCEDURE — 27130 TOTAL HIP ARTHROPLASTY: CPT | Performed by: NURSE PRACTITIONER

## 2025-02-17 PROCEDURE — 97110 THERAPEUTIC EXERCISES: CPT

## 2025-02-17 PROCEDURE — 25010000002 KETOROLAC TROMETHAMINE PER 15 MG: Performed by: ORTHOPAEDIC SURGERY

## 2025-02-17 PROCEDURE — C1713 ANCHOR/SCREW BN/BN,TIS/BN: HCPCS | Performed by: ORTHOPAEDIC SURGERY

## 2025-02-17 PROCEDURE — 73501 X-RAY EXAM HIP UNI 1 VIEW: CPT

## 2025-02-17 PROCEDURE — 25010000002 LIDOCAINE PF 1% 1 % SOLUTION: Performed by: ANESTHESIOLOGY

## 2025-02-17 PROCEDURE — 25010000002 PROPOFOL 200 MG/20ML EMULSION

## 2025-02-17 PROCEDURE — 25810000003 LACTATED RINGERS SOLUTION: Performed by: ORTHOPAEDIC SURGERY

## 2025-02-17 PROCEDURE — 25010000002 HYDROMORPHONE PER 4 MG

## 2025-02-17 DEVICE — IMPLANTABLE DEVICE: Type: IMPLANTABLE DEVICE | Status: FUNCTIONAL

## 2025-02-17 DEVICE — POLARSTEM COLLAR STANDARD                                    NON-CEMENTED WITH TI/HA 2
Type: IMPLANTABLE DEVICE | Site: HIP | Status: FUNCTIONAL
Brand: POLARSTEM

## 2025-02-17 DEVICE — KNOTLESS TISSUE CONTROL DEVICE, VIOLET UNIDIRECTIONAL (ANTIBACTERIAL) SYNTHETIC ABSORBABLE DEVICE
Type: IMPLANTABLE DEVICE | Site: HIP | Status: FUNCTIONAL
Brand: STRATAFIX

## 2025-02-17 DEVICE — R3 0 DEGREE XLPE ACETABULAR LINER                                    36MM INNER DIAMETER X OUTER DIAMETER 56MM
Type: IMPLANTABLE DEVICE | Site: HIP | Status: FUNCTIONAL
Brand: R3

## 2025-02-17 DEVICE — OXINIUM FEMORAL HEAD 12/14 TAPER                                    36 MM +0
Type: IMPLANTABLE DEVICE | Site: HIP | Status: FUNCTIONAL
Brand: OXINIUM

## 2025-02-17 DEVICE — KNOTLESS TISSUE CONTROL DEVICE, UNDYED UNIDIRECTIONAL (ANTIBACTERIAL) SYNTHETIC ABSORBABLE DEVICE
Type: IMPLANTABLE DEVICE | Site: HIP | Status: FUNCTIONAL
Brand: STRATAFIX

## 2025-02-17 DEVICE — REFLECTION SPHERICAL HEAD SCREW 25MM
Type: IMPLANTABLE DEVICE | Site: HIP | Status: FUNCTIONAL
Brand: REFLECTION

## 2025-02-17 DEVICE — R3 3 HOLE ACETABULAR SHELL 56MM
Type: IMPLANTABLE DEVICE | Site: HIP | Status: FUNCTIONAL
Brand: R3 ACETABULAR

## 2025-02-17 DEVICE — REFLECTION SPHERICAL HEAD SCREW 20MM
Type: IMPLANTABLE DEVICE | Site: HIP | Status: FUNCTIONAL
Brand: REFLECTION

## 2025-02-17 RX ORDER — ACETAMINOPHEN 325 MG/1
650 TABLET ORAL EVERY 6 HOURS PRN
Status: DISCONTINUED | OUTPATIENT
Start: 2025-02-17 | End: 2025-02-17 | Stop reason: HOSPADM

## 2025-02-17 RX ORDER — POLYETHYLENE GLYCOL 3350 17 G/17G
17 POWDER, FOR SOLUTION ORAL 2 TIMES DAILY
Qty: 238 G | Refills: 0 | Status: SHIPPED | OUTPATIENT
Start: 2025-02-17 | End: 2025-02-24

## 2025-02-17 RX ORDER — ONDANSETRON 4 MG/1
4 TABLET, FILM COATED ORAL EVERY 8 HOURS PRN
Qty: 10 TABLET | Refills: 0 | Status: SHIPPED | OUTPATIENT
Start: 2025-02-17

## 2025-02-17 RX ORDER — LABETALOL HYDROCHLORIDE 5 MG/ML
5 INJECTION, SOLUTION INTRAVENOUS
Status: DISCONTINUED | OUTPATIENT
Start: 2025-02-17 | End: 2025-02-17 | Stop reason: HOSPADM

## 2025-02-17 RX ORDER — HYDROCODONE BITARTRATE AND ACETAMINOPHEN 5; 325 MG/1; MG/1
1 TABLET ORAL ONCE AS NEEDED
Status: DISCONTINUED | OUTPATIENT
Start: 2025-02-17 | End: 2025-02-17 | Stop reason: HOSPADM

## 2025-02-17 RX ORDER — FENTANYL CITRATE 50 UG/ML
50 INJECTION, SOLUTION INTRAMUSCULAR; INTRAVENOUS ONCE AS NEEDED
Status: DISCONTINUED | OUTPATIENT
Start: 2025-02-17 | End: 2025-02-17 | Stop reason: HOSPADM

## 2025-02-17 RX ORDER — SODIUM CHLORIDE 0.9 % (FLUSH) 0.9 %
3-10 SYRINGE (ML) INJECTION AS NEEDED
Status: DISCONTINUED | OUTPATIENT
Start: 2025-02-17 | End: 2025-02-17 | Stop reason: HOSPADM

## 2025-02-17 RX ORDER — SODIUM CHLORIDE 0.9 % (FLUSH) 0.9 %
3 SYRINGE (ML) INJECTION EVERY 12 HOURS SCHEDULED
Status: DISCONTINUED | OUTPATIENT
Start: 2025-02-17 | End: 2025-02-17 | Stop reason: HOSPADM

## 2025-02-17 RX ORDER — HYDROMORPHONE HYDROCHLORIDE 1 MG/ML
0.25 INJECTION, SOLUTION INTRAMUSCULAR; INTRAVENOUS; SUBCUTANEOUS
Status: DISCONTINUED | OUTPATIENT
Start: 2025-02-17 | End: 2025-02-17 | Stop reason: HOSPADM

## 2025-02-17 RX ORDER — PROPOFOL 10 MG/ML
INJECTION, EMULSION INTRAVENOUS AS NEEDED
Status: DISCONTINUED | OUTPATIENT
Start: 2025-02-17 | End: 2025-02-17 | Stop reason: SURG

## 2025-02-17 RX ORDER — HYDRALAZINE HYDROCHLORIDE 20 MG/ML
5 INJECTION INTRAMUSCULAR; INTRAVENOUS
Status: DISCONTINUED | OUTPATIENT
Start: 2025-02-17 | End: 2025-02-17 | Stop reason: HOSPADM

## 2025-02-17 RX ORDER — DEXAMETHASONE SODIUM PHOSPHATE 4 MG/ML
INJECTION, SOLUTION INTRA-ARTICULAR; INTRALESIONAL; INTRAMUSCULAR; INTRAVENOUS; SOFT TISSUE AS NEEDED
Status: DISCONTINUED | OUTPATIENT
Start: 2025-02-17 | End: 2025-02-17 | Stop reason: SURG

## 2025-02-17 RX ORDER — ASPIRIN 81 MG/1
81 TABLET ORAL EVERY 12 HOURS SCHEDULED
Status: DISCONTINUED | OUTPATIENT
Start: 2025-02-18 | End: 2025-02-17 | Stop reason: HOSPADM

## 2025-02-17 RX ORDER — TRANEXAMIC ACID 100 MG/ML
INJECTION, SOLUTION INTRAVENOUS AS NEEDED
Status: DISCONTINUED | OUTPATIENT
Start: 2025-02-17 | End: 2025-02-17 | Stop reason: SURG

## 2025-02-17 RX ORDER — ONDANSETRON 4 MG/1
4 TABLET, ORALLY DISINTEGRATING ORAL EVERY 6 HOURS PRN
Status: DISCONTINUED | OUTPATIENT
Start: 2025-02-17 | End: 2025-02-17 | Stop reason: HOSPADM

## 2025-02-17 RX ORDER — ACETAMINOPHEN 10 MG/ML
INJECTION, SOLUTION INTRAVENOUS AS NEEDED
Status: DISCONTINUED | OUTPATIENT
Start: 2025-02-17 | End: 2025-02-17 | Stop reason: SURG

## 2025-02-17 RX ORDER — ATROPINE SULFATE 0.4 MG/ML
0.4 INJECTION, SOLUTION INTRAMUSCULAR; INTRAVENOUS; SUBCUTANEOUS ONCE AS NEEDED
Status: DISCONTINUED | OUTPATIENT
Start: 2025-02-17 | End: 2025-02-17 | Stop reason: HOSPADM

## 2025-02-17 RX ORDER — EPHEDRINE SULFATE 50 MG/ML
5 INJECTION, SOLUTION INTRAVENOUS ONCE AS NEEDED
Status: DISCONTINUED | OUTPATIENT
Start: 2025-02-17 | End: 2025-02-17 | Stop reason: HOSPADM

## 2025-02-17 RX ORDER — PROMETHAZINE HYDROCHLORIDE 25 MG/1
12.5 TABLET ORAL EVERY 4 HOURS PRN
Status: DISCONTINUED | OUTPATIENT
Start: 2025-02-17 | End: 2025-02-17 | Stop reason: HOSPADM

## 2025-02-17 RX ORDER — FLUMAZENIL 0.1 MG/ML
0.2 INJECTION INTRAVENOUS AS NEEDED
Status: DISCONTINUED | OUTPATIENT
Start: 2025-02-17 | End: 2025-02-17 | Stop reason: HOSPADM

## 2025-02-17 RX ORDER — PROMETHAZINE HYDROCHLORIDE 25 MG/1
25 TABLET ORAL ONCE AS NEEDED
Status: DISCONTINUED | OUTPATIENT
Start: 2025-02-17 | End: 2025-02-17 | Stop reason: HOSPADM

## 2025-02-17 RX ORDER — MAGNESIUM HYDROXIDE 1200 MG/15ML
LIQUID ORAL AS NEEDED
Status: DISCONTINUED | OUTPATIENT
Start: 2025-02-17 | End: 2025-02-17 | Stop reason: HOSPADM

## 2025-02-17 RX ORDER — ROCURONIUM BROMIDE 10 MG/ML
INJECTION, SOLUTION INTRAVENOUS AS NEEDED
Status: DISCONTINUED | OUTPATIENT
Start: 2025-02-17 | End: 2025-02-17 | Stop reason: SURG

## 2025-02-17 RX ORDER — CHLORHEXIDINE GLUCONATE 500 MG/1
CLOTH TOPICAL 2 TIMES DAILY
Status: DISCONTINUED | OUTPATIENT
Start: 2025-02-17 | End: 2025-02-17 | Stop reason: HOSPADM

## 2025-02-17 RX ORDER — MIDAZOLAM HYDROCHLORIDE 1 MG/ML
0.5 INJECTION, SOLUTION INTRAMUSCULAR; INTRAVENOUS
Status: COMPLETED | OUTPATIENT
Start: 2025-02-17 | End: 2025-02-17

## 2025-02-17 RX ORDER — ASPIRIN 81 MG/1
TABLET ORAL
Qty: 60 TABLET | Refills: 0 | Status: SHIPPED | OUTPATIENT
Start: 2025-02-17 | End: 2025-04-04

## 2025-02-17 RX ORDER — LIDOCAINE HYDROCHLORIDE 20 MG/ML
INJECTION, SOLUTION EPIDURAL; INFILTRATION; INTRACAUDAL; PERINEURAL AS NEEDED
Status: DISCONTINUED | OUTPATIENT
Start: 2025-02-17 | End: 2025-02-17 | Stop reason: SURG

## 2025-02-17 RX ORDER — FENTANYL CITRATE 50 UG/ML
INJECTION, SOLUTION INTRAMUSCULAR; INTRAVENOUS AS NEEDED
Status: DISCONTINUED | OUTPATIENT
Start: 2025-02-17 | End: 2025-02-17 | Stop reason: SURG

## 2025-02-17 RX ORDER — HYDROCODONE BITARTRATE AND ACETAMINOPHEN 7.5; 325 MG/1; MG/1
1 TABLET ORAL EVERY 4 HOURS PRN
Status: DISCONTINUED | OUTPATIENT
Start: 2025-02-17 | End: 2025-02-17 | Stop reason: HOSPADM

## 2025-02-17 RX ORDER — FAMOTIDINE 10 MG/ML
20 INJECTION, SOLUTION INTRAVENOUS ONCE
Status: COMPLETED | OUTPATIENT
Start: 2025-02-17 | End: 2025-02-17

## 2025-02-17 RX ORDER — ONDANSETRON 2 MG/ML
INJECTION INTRAMUSCULAR; INTRAVENOUS AS NEEDED
Status: DISCONTINUED | OUTPATIENT
Start: 2025-02-17 | End: 2025-02-17 | Stop reason: SURG

## 2025-02-17 RX ORDER — MELOXICAM 15 MG/1
15 TABLET ORAL DAILY
Qty: 14 TABLET | Refills: 0 | Status: SHIPPED | OUTPATIENT
Start: 2025-02-17

## 2025-02-17 RX ORDER — PROMETHAZINE HYDROCHLORIDE 25 MG/1
25 SUPPOSITORY RECTAL ONCE AS NEEDED
Status: DISCONTINUED | OUTPATIENT
Start: 2025-02-17 | End: 2025-02-17 | Stop reason: HOSPADM

## 2025-02-17 RX ORDER — PREGABALIN 75 MG/1
150 CAPSULE ORAL ONCE
Status: COMPLETED | OUTPATIENT
Start: 2025-02-17 | End: 2025-02-17

## 2025-02-17 RX ORDER — FENTANYL CITRATE 50 UG/ML
25 INJECTION, SOLUTION INTRAMUSCULAR; INTRAVENOUS
Status: DISCONTINUED | OUTPATIENT
Start: 2025-02-17 | End: 2025-02-17 | Stop reason: HOSPADM

## 2025-02-17 RX ORDER — IPRATROPIUM BROMIDE AND ALBUTEROL SULFATE 2.5; .5 MG/3ML; MG/3ML
3 SOLUTION RESPIRATORY (INHALATION) ONCE AS NEEDED
Status: DISCONTINUED | OUTPATIENT
Start: 2025-02-17 | End: 2025-02-17 | Stop reason: HOSPADM

## 2025-02-17 RX ORDER — ONDANSETRON 2 MG/ML
4 INJECTION INTRAMUSCULAR; INTRAVENOUS ONCE AS NEEDED
Status: DISCONTINUED | OUTPATIENT
Start: 2025-02-17 | End: 2025-02-17 | Stop reason: HOSPADM

## 2025-02-17 RX ORDER — PANTOPRAZOLE SODIUM 40 MG/1
40 TABLET, DELAYED RELEASE ORAL DAILY
Qty: 14 TABLET | Refills: 0 | Status: SHIPPED | OUTPATIENT
Start: 2025-02-17 | End: 2025-03-03

## 2025-02-17 RX ORDER — LIDOCAINE HYDROCHLORIDE 10 MG/ML
0.5 INJECTION, SOLUTION INFILTRATION; PERINEURAL ONCE AS NEEDED
Status: COMPLETED | OUTPATIENT
Start: 2025-02-17 | End: 2025-02-17

## 2025-02-17 RX ORDER — OXYCODONE AND ACETAMINOPHEN 10; 325 MG/1; MG/1
1 TABLET ORAL EVERY 4 HOURS PRN
Qty: 30 TABLET | Refills: 0 | Status: SHIPPED | OUTPATIENT
Start: 2025-02-17

## 2025-02-17 RX ORDER — NALOXONE HCL 0.4 MG/ML
0.2 VIAL (ML) INJECTION AS NEEDED
Status: DISCONTINUED | OUTPATIENT
Start: 2025-02-17 | End: 2025-02-17 | Stop reason: HOSPADM

## 2025-02-17 RX ORDER — MELOXICAM 15 MG/1
15 TABLET ORAL ONCE
Status: COMPLETED | OUTPATIENT
Start: 2025-02-17 | End: 2025-02-17

## 2025-02-17 RX ORDER — DIPHENHYDRAMINE HYDROCHLORIDE 50 MG/ML
12.5 INJECTION INTRAMUSCULAR; INTRAVENOUS
Status: DISCONTINUED | OUTPATIENT
Start: 2025-02-17 | End: 2025-02-17 | Stop reason: HOSPADM

## 2025-02-17 RX ORDER — OXYCODONE AND ACETAMINOPHEN 10; 325 MG/1; MG/1
1 TABLET ORAL EVERY 4 HOURS PRN
Status: DISCONTINUED | OUTPATIENT
Start: 2025-02-17 | End: 2025-02-17 | Stop reason: HOSPADM

## 2025-02-17 RX ORDER — SODIUM CHLORIDE, SODIUM LACTATE, POTASSIUM CHLORIDE, CALCIUM CHLORIDE 600; 310; 30; 20 MG/100ML; MG/100ML; MG/100ML; MG/100ML
9 INJECTION, SOLUTION INTRAVENOUS CONTINUOUS
Status: DISCONTINUED | OUTPATIENT
Start: 2025-02-17 | End: 2025-02-17 | Stop reason: HOSPADM

## 2025-02-17 RX ADMIN — HYDROMORPHONE HYDROCHLORIDE 0.25 MG: 1 INJECTION, SOLUTION INTRAMUSCULAR; INTRAVENOUS; SUBCUTANEOUS at 10:05

## 2025-02-17 RX ADMIN — SODIUM CHLORIDE, POTASSIUM CHLORIDE, SODIUM LACTATE AND CALCIUM CHLORIDE 500 ML: 600; 310; 30; 20 INJECTION, SOLUTION INTRAVENOUS at 06:48

## 2025-02-17 RX ADMIN — TRANEXAMIC ACID 1000 MG: 100 INJECTION, SOLUTION INTRAVENOUS at 09:18

## 2025-02-17 RX ADMIN — ROCURONIUM BROMIDE 70 MG: 10 INJECTION, SOLUTION INTRAVENOUS at 08:15

## 2025-02-17 RX ADMIN — FENTANYL CITRATE 50 MCG: 50 INJECTION INTRAMUSCULAR; INTRAVENOUS at 08:41

## 2025-02-17 RX ADMIN — LIDOCAINE HYDROCHLORIDE 100 MG: 20 INJECTION, SOLUTION EPIDURAL; INFILTRATION; INTRACAUDAL; PERINEURAL at 08:15

## 2025-02-17 RX ADMIN — PREGABALIN 150 MG: 75 CAPSULE ORAL at 06:49

## 2025-02-17 RX ADMIN — ONDANSETRON 4 MG: 2 INJECTION, SOLUTION INTRAMUSCULAR; INTRAVENOUS at 09:28

## 2025-02-17 RX ADMIN — CEFAZOLIN 2 G: 2 INJECTION, POWDER, FOR SOLUTION INTRAMUSCULAR; INTRAVENOUS at 08:02

## 2025-02-17 RX ADMIN — HYDROMORPHONE HYDROCHLORIDE 0.25 MG: 1 INJECTION, SOLUTION INTRAMUSCULAR; INTRAVENOUS; SUBCUTANEOUS at 10:31

## 2025-02-17 RX ADMIN — DEXAMETHASONE SODIUM PHOSPHATE 8 MG: 4 INJECTION, SOLUTION INTRAMUSCULAR; INTRAVENOUS at 08:23

## 2025-02-17 RX ADMIN — HYDROMORPHONE HYDROCHLORIDE 0.25 MG: 1 INJECTION, SOLUTION INTRAMUSCULAR; INTRAVENOUS; SUBCUTANEOUS at 10:28

## 2025-02-17 RX ADMIN — HYDROCODONE BITARTRATE AND ACETAMINOPHEN 1 TABLET: 7.5; 325 TABLET ORAL at 10:01

## 2025-02-17 RX ADMIN — PROPOFOL 150 MG: 10 INJECTION, EMULSION INTRAVENOUS at 08:15

## 2025-02-17 RX ADMIN — MELOXICAM 15 MG: 15 TABLET ORAL at 07:09

## 2025-02-17 RX ADMIN — PROPOFOL 125 MCG/KG/MIN: 10 INJECTION, EMULSION INTRAVENOUS at 08:23

## 2025-02-17 RX ADMIN — FENTANYL CITRATE 50 MCG: 50 INJECTION INTRAMUSCULAR; INTRAVENOUS at 08:15

## 2025-02-17 RX ADMIN — SODIUM CHLORIDE, POTASSIUM CHLORIDE, SODIUM LACTATE AND CALCIUM CHLORIDE 9 ML/HR: 600; 310; 30; 20 INJECTION, SOLUTION INTRAVENOUS at 10:49

## 2025-02-17 RX ADMIN — TRANEXAMIC ACID 1000 MG: 100 INJECTION, SOLUTION INTRAVENOUS at 08:23

## 2025-02-17 RX ADMIN — SODIUM CHLORIDE, POTASSIUM CHLORIDE, SODIUM LACTATE AND CALCIUM CHLORIDE: 600; 310; 30; 20 INJECTION, SOLUTION INTRAVENOUS at 08:15

## 2025-02-17 RX ADMIN — MIDAZOLAM 0.5 MG: 1 INJECTION INTRAMUSCULAR; INTRAVENOUS at 07:22

## 2025-02-17 RX ADMIN — LIDOCAINE HYDROCHLORIDE 0.5 ML: 10 INJECTION, SOLUTION EPIDURAL; INFILTRATION; INTRACAUDAL; PERINEURAL at 06:48

## 2025-02-17 RX ADMIN — HYDROMORPHONE HYDROCHLORIDE 0.25 MG: 1 INJECTION, SOLUTION INTRAMUSCULAR; INTRAVENOUS; SUBCUTANEOUS at 10:01

## 2025-02-17 RX ADMIN — SODIUM CHLORIDE 1750 MG: 9 INJECTION, SOLUTION INTRAVENOUS at 06:51

## 2025-02-17 RX ADMIN — SUGAMMADEX 200 MG: 100 INJECTION, SOLUTION INTRAVENOUS at 09:39

## 2025-02-17 RX ADMIN — ACETAMINOPHEN 1000 MG: 1000 INJECTION INTRAVENOUS at 08:23

## 2025-02-17 RX ADMIN — MIDAZOLAM 0.5 MG: 1 INJECTION INTRAMUSCULAR; INTRAVENOUS at 07:49

## 2025-02-17 RX ADMIN — FAMOTIDINE 20 MG: 10 INJECTION INTRAVENOUS at 07:12

## 2025-02-17 NOTE — ANESTHESIA POSTPROCEDURE EVALUATION
Patient: Mario Osullivan Jr.    Procedure Summary       Date: 02/17/25 Room / Location:  [facility] [address] [place] [address] [facility]    Anesthesia Start: 0808 Anesthesia Stop: 0950    Procedure: TOTAL HIP ARTHROPLASTY ANTERIOR WITH HANA TABLE (Left: Hip) Diagnosis:       Primary osteoarthritis of left hip      (Primary osteoarthritis of left hip [M16.12])    Surgeons: Hussain Alexandre MD Provider: Smooth Tejeda MD    Anesthesia Type: general ASA Status: 3            Anesthesia Type: general    Vitals  Vitals Value Taken Time   /68 02/17/25 1130   Temp 36.7 °C (98 °F) 02/17/25 1100   Pulse 64 02/17/25 1137   Resp 18 02/17/25 1130   SpO2 96 % 02/17/25 1137   Vitals shown include unfiled device data.        Post Anesthesia Care and Evaluation    Patient location during evaluation: bedside  Patient participation: complete - patient participated  Level of consciousness: awake and alert  Pain management: adequate    Airway patency: patent  Anesthetic complications: No anesthetic complications  PONV Status: controlled  Cardiovascular status: blood pressure returned to baseline and acceptable  Respiratory status: acceptable  Hydration status: acceptable

## 2025-02-17 NOTE — THERAPY DISCHARGE NOTE
"Patient Name: Mario Osullivan Jr.  : 1955    MRN: [id number]                              Today's Date: 2025       Admit Date: 2025    Visit Dx:     ICD-10-CM ICD-9-CM   1. Status post total hip replacement, left  Z96.642 V43.64   2. Primary osteoarthritis of left hip  M16.12 715.15     Patient Active Problem List   Diagnosis    Hypersomnia    Primary osteoarthritis of left hip    OA (osteoarthritis) of knee     Past Medical History:   Diagnosis Date    Anxiety and depression     Bronchitis 2025    RECENTLY TREATED FOR THIS    GERD (gastroesophageal reflux disease)     History of sepsis     OA (osteoarthritis) of hip     LEFT:  PAIN, LIMITED MOBILITY    Sleep apnea     CPAP     Past Surgical History:   Procedure Laterality Date    COLONOSCOPY      CYST REMOVAL      STATES SEVERAL NON CANCEROUS \"TUMORS\" REMOVED    ENDOSCOPY      HEMORRHOIDECTOMY      LASIK Bilateral     PROSTATE BIOPSY        General Information       Row Name 25 7842          Physical Therapy Time and Intention    Document Type discharge evaluation/summary  Pt. is s/p Left THR  -MS     Mode of Treatment physical therapy;individual therapy  -MS       Row Name 25 7640          General Information    Patient Profile Reviewed yes  -MS     Prior Level of Function independent:  -MS     Barriers to Rehab none identified  -MS       Row Name 25 0903          Cognition    Orientation Status (Cognition) oriented x 3  -MS       Row Name 25 9277          Safety Issues/Impairments Affecting Functional Mobility    Comment, Safety Issues/Impairments (Mobility) Gait belt used for safety.  -MS               User Key  (r) = Recorded By, (t) = Taken By, (c) = Cosigned By      Initials Name Provider Type    Yogesh Godfrey, PT Physical Therapist                   Mobility       Row Name 25 0088          Bed Mobility    Comment, (Bed Mobility) Up in chair this PM.  -MS       Row Name 25 9765          " Sit-Stand Transfer    Sit-Stand Nancy (Transfers) independent  -MS     Assistive Device (Sit-Stand Transfers) walker, front-wheeled  -MS       Row Name 02/17/25 1337          Gait/Stairs (Locomotion)    Nancy Level (Gait) standby assist  -MS     Assistive Device (Gait) walker, front-wheeled  -MS     Distance in Feet (Gait) 120  -MS     Nancy Level (Stairs) stand by assist  -MS     Number of Steps (Stairs) 2 (Backwards with use of Rwx)  -MS     Ascending Technique (Stairs) step-to-step  -MS     Descending Technique (Stairs) step-to-step  -MS               User Key  (r) = Recorded By, (t) = Taken By, (c) = Cosigned By      Initials Name Provider Type    Yogesh Godfrey, PT Physical Therapist                   Obj/Interventions       Row Name 02/17/25 1331          Range of Motion Comprehensive    Comment, General Range of Motion BUE/RLE (WFL's)  -MS       Row Name 02/17/25 1338          Strength Comprehensive (MMT)    Comment, General Manual Muscle Testing (MMT) Assessment BUE/RLE (>/=3/5)  -MS       Row Name 02/17/25 1333          Motor Skills    Therapeutic Exercise --  Left THR ther. ex. program x 10 reps completed  -MS               User Key  (r) = Recorded By, (t) = Taken By, (c) = Cosigned By      Initials Name Provider Type    Yogesh Godfrey, PT Physical Therapist                   Goals/Plan    No documentation.                  Clinical Impression       Row Name 02/17/25 8344          Pain    Pretreatment Pain Rating 6/10  -MS     Posttreatment Pain Rating 6/10  -MS     Pain Location hip  -MS     Pain Side/Orientation left  -MS     Pain Management Interventions nursing notified;premedicated for activity;positioning techniques utilized  -MS     Pain Intervention(s) Medication (See MAR);Nursing Notified;Elevated;Repositioned;Cold applied  -MS       Row Name 02/17/25 9283          Plan of Care Review    Plan of Care Reviewed With patient;family  -MS       Row Name 02/17/25 6148           Positioning and Restraints    Pre-Treatment Position sitting in chair/recliner  -MS     Post Treatment Position chair  -MS     In Chair notified nsg;reclined;sitting;call light within reach;encouraged to call for assist;with family/caregiver  -MS               User Key  (r) = Recorded By, (t) = Taken By, (c) = Cosigned By      Initials Name Provider Type    MS BhaktaAlvaradoYogesh, PT Physical Therapist                   Outcome Measures       Row Name 02/17/25 1340          How much help from another person do you currently need...    Turning from your back to your side while in flat bed without using bedrails? 4  -MS     Moving from lying on back to sitting on the side of a flat bed without bedrails? 4  -MS     Moving to and from a bed to a chair (including a wheelchair)? 4  -MS     Standing up from a chair using your arms (e.g., wheelchair, bedside chair)? 4  -MS     Climbing 3-5 steps with a railing? 3  -MS     To walk in hospital room? 3  -MS     AM-PAC 6 Clicks Score (PT) 22  -MS     Highest Level of Mobility Goal 7 --> Walk 25 feet or more  -MS       Row Name 02/17/25 1340          Functional Assessment    Outcome Measure Options AM-PAC 6 Clicks Basic Mobility (PT)  -MS               User Key  (r) = Recorded By, (t) = Taken By, (c) = Cosigned By      Initials Name Provider Type    Yogesh Godfrey, PT Physical Therapist                  Physical Therapy Education       Title: PT OT SLP Therapies (Resolved)       Topic: Physical Therapy (Resolved)       Point: Mobility training (Resolved)       Learning Progress Summary            Patient Acceptance, E,D, VU,DU by MS at 2/17/2025 1340                      Point: Home exercise program (Resolved)       Learning Progress Summary            Patient Acceptance, E,D, VU,DU by MS at 2/17/2025 1340                      Point: Body mechanics (Resolved)       Learning Progress Summary            Patient Acceptance, E,D, VU,DU by MS at 2/17/2025 1340                       Point: Precautions (Resolved)       Learning Progress Summary            Patient Acceptance, E,D, VU,DU by MS at 2/17/2025 1340                                      User Key       Initials Effective Dates Name Provider Type Discipline    MS 06/16/21 -  Yogesh Alvarado, PT Physical Therapist PT                  PT Recommendation and Plan           Time Calculation:         PT Charges       Row Name 02/17/25 1341             Time Calculation    Start Time 1250  -MS      Stop Time 1305  -MS      Time Calculation (min) 15 min  -MS      PT Received On 02/17/25  -MS         Time Calculation- PT    Total Timed Code Minutes- PT 14 minute(s)  -MS                User Key  (r) = Recorded By, (t) = Taken By, (c) = Cosigned By      Initials Name Provider Type    MS Yogesh Alvarado, PT Physical Therapist                  Therapy Charges for Today       Code Description Service Date Service Provider Modifiers Qty    86100083445 HC PT EVAL LOW COMPLEXITY 2 2/17/2025 Yogesh Alvarado, PT GP 1    75608488242 HC PT THER PROC EA 15 MIN 2/17/2025 Yogesh Alvarado, PT GP 1            PT G-Codes  Outcome Measure Options: AM-PAC 6 Clicks Basic Mobility (PT)  AM-PAC 6 Clicks Score (PT): 22    PT Discharge Summary  Anticipated Discharge Disposition (PT): home with assist, home with home health  Reason for Discharge: Discharge from facility  Discharge Destination: Home with assist, Home with home health    Yogesh Alvarado, PT  2/17/2025

## 2025-02-17 NOTE — OP NOTE
Name: Mario Osullivan Jr.  YOB: 1955    DATE OF SURGERY: 2/17/2025    PREOPERATIVE DIAGNOSIS: Left hip end-stage osteoarthritis    POSTOPERATIVE DIAGNOSIS: Left hip end-stage osteoarthritis    PROCEDURE PERFORMED: Left anterior total hip replacement    SURGEON: Hussain Alexandre M.D.    ASSISTANT: JOEL BUSH    A surgical assistant was integral in ensuring a successful outcome with this procedure.  The assistant was utilized to assist in positioning the patient, draping the patient, was used throughout the case to provide with retraction of tissues, suctioning of blood and body fluids for visualization, positioning of the extremity to allow for proper exposure so that I could perform the procedure.  Without the use of a surgical assistant during this procedure I feel that the outcome may have been compromised or would have been suboptimal or at risk for complications.    IMPLANTS: [employer] Polar stem, R3 cup:  Implant Name Type Inv. Item Serial No.  Lot No. LRB No. Used Action   DEV CONTRL TISS STRATAFIX SPIRAL MNCRYL UD 3/0 PLS 30CM - [id number] Implant DEV CONTRL TISS STRATAFIX SPIRAL MNCRYL UD 3/0 PLS 30CM  [employer]  DIV OF J AND J [id number] Left 1 Implanted   DEV WND/CLS CONTRL TISS STRATAFIX SPIRAL PDS PLS CT1 0 30CM - [id number] Implant DEV WND/CLS CONTRL TISS STRATAFIX SPIRAL PDS PLS CT1 0 30CM  [employer]  DIV OF J AND J [id number] Left 1 Implanted   LINER ACET R3 XLPE 0D [id number] - [id number] Implant LINER ACET R3 XLPE 0D [id number]  [employer] [id number] Left 1 Implanted   SHLL ACET R3 3H STD 56MM - [id number] Implant SHLL ACET R3 3H STD 56MM  [employer] [id number] Left 1 Implanted   SCRW SPH HD REFLECTION 6.5X20MM - [id number] Implant SCRW SPH HD REFLECTION 6.5X20MM  [employer] [id number] Left 1 Implanted   SCRW SPH HD REFLECTION 6.5X25MM - [id number] Implant SCRW SPH HD REFLECTION 6.5X25MM  [employer] [id number] Left 1 Implanted   STEM FEM/HIP POLARSTEM  "W/COLR STD SZ2 - [id number] Implant STEM FEM/HIP POLARSTEM W/COLR STD SZ2  [employer] [id number] Left 1 Implanted   HD FEM/HIP OXINIUM TPR 12/14 36MM PLS0 - [id number] Implant HD FEM/HIP OXINIUM TPR 12/14 36MM PLS0  [employer] [id number] Left 1 Implanted       Estimated Blood Loss: 200cc  Specimens : none  Complications: none    DESCRIPTION OF PROCEDURE: The patient was taken to the operating room and placed in the supine position. A sequential compression device was carefully placed on the non-operative leg. Preoperative antibiotics were administered. Surgical time out was performed. After adequate induction of anesthesia, the feet were padded and placed in the Bradshaw table boots. The patient ws then transferred onto the Bradshaw table and positioned appropriately. C-arm image intensification was then used to take images of the pelvis and operative hip to be used for later comparison. The hip was then prepped and draped in the usual sterile fashion.   An incision was then made starting 2 cm lateral to the ASIS heading distal and lateral at approximately 30 degrees. The subcutaneous fat was then divided down to the fascia overlying the tensor fascia devi (TFL) muscle. This was sharply divided staying a few cm lateral to the interval between the sartorius and the TFL muscle. This interval was then bluntly dissected. The circumflex vessels were then identified, cauterized, and divided. There was excellent hemostasis. Cobra retractors were then placed around the femoral neck capsule. The capsule was then divided using a \"T\" capsulotomy. The retractors were then placed intracapsular and the neck osteotomy was performed. A napkin ring neck fragment was then removed and then the head was removed using a corkscrew. There were end-stage arthritic findings.   The acetabulum was then exposed with \"number 7\" retractors. The labrum and pulvinar were excised. The starting reamer was then used to medialize the cup and then the " acetabular reaming proceeded in 2 mm increments. The cup was reamed line to line. The cup was then partially seated and c-arm was used to confirm the final cup position before final seating occurred. There was excellent position and stability of the cup.  The hip was then injected with anesthetic cocktail and the cup was anchored with 2 screws in the superior and posterior quadrants. The final liner was placed.   Attention was turned to the femur. Traction was removed from the hip and the leg was brought to the neutral position. The femoral elevator hook was placed. The leg was then moved to the external rotation, extension, and adduction position. Retractors were placed around the proximal femur and then the posterior capsule and conjoined tendon was then released. The box osteotome was then used to create the starting hole. The femur was then prepared using the rat tail broach, followed by the chili-pepper broach and then we progressively broached up the final broach which fit nicely with excellent rotational and axial stability. The hip was then reduced and c-arm images were taken which confirmed appropriate fit and position on the implants. There were no complicating factors noted, and fluoro images were taken which confirmed proper restoration of leg length and offset. The trial components were removed, the hip was copiously irrigated and the final implants were then seated. C-arm images again confirmed appropriate anatomy restoration without complicating factors noted. The final head was then placed on a clean dry taper and the hip reduced.   The hip was then copiously irrigated.  There was excellent hemostasis. We placed a one-eighth inch Hemovac drain. We closed the hip in multiple layers in standard fashion. Sterile dressings were applied. At the end of the case, the sponge and needle counts were reported as being correct. There were no known complications. The patient was then transported to the recovery  [facility].      Hussain Alexandre M.D.  2/17/2025

## 2025-02-17 NOTE — ANESTHESIA PREPROCEDURE EVALUATION
Anesthesia Evaluation     Patient summary reviewed and Nursing notes reviewed   NPO Solid Status: > 8 hours             Airway   Mallampati: II  TM distance: >3 FB  Neck ROM: full  no difficulty expected  Dental - normal exam     Pulmonary - normal exam   (+) ,sleep apnea  Cardiovascular - negative cardio ROS and normal exam        Neuro/Psych  (+) psychiatric history  GI/Hepatic/Renal/Endo    (+) obesity    Musculoskeletal (-) negative ROS    Abdominal    Substance History - negative use     OB/GYN negative ob/gyn ROS         Other                    Anesthesia Plan    ASA 3     general     Reason for not using neuraxial anesthesia or peripheral nerve block: Surgeon Refused  (There were no vitals filed for this visit.)  intravenous induction     Anesthetic plan, risks, benefits, and alternatives have been provided, discussed and informed consent has been obtained with: patient.    CODE STATUS:

## 2025-02-17 NOTE — ANESTHESIA PROCEDURE NOTES
Airway  Urgency: elective    Date/Time: 2/17/2025 8:18 AM  Airway not difficult    General Information and Staff    Patient location during procedure: OR  Anesthesiologist: Smooth Tejeda MD  CRNA/CAA: Ava Almendarez CRNA    Indications and Patient Condition  Indications for airway management: airway protection    Preoxygenated: yes  Mask difficulty assessment: 1 - vent by mask    Final Airway Details  Final airway type: endotracheal airway      Successful airway: ETT  Cuffed: yes   Successful intubation technique: direct laryngoscopy  Facilitating devices/methods: intubating stylet and anterior pressure/BURP  Endotracheal tube insertion site: oral  Blade: Kathy  Blade size: 4  ETT size (mm): 7.5  Cormack-Lehane Classification: grade IIa - partial view of glottis  Placement verified by: chest auscultation and capnometry   Cuff volume (mL): 5  Measured from: lips  ETT/EBT  to lips (cm): 23  Number of attempts at approach: 1  Assessment: lips, teeth, and gum same as pre-op and atraumatic intubation

## 2025-02-17 NOTE — CASE MANAGEMENT/SOCIAL WORK
----- Message from Radha Antoine MD sent at 10/3/2017  8:49 AM CDT -----  Do we have any way to check on Mr. Prescott's colonoscopy scheduling? He was seen 2 weeks ago and says he still doesn't have appt.    Thank you!   Continued Stay Note  [facility]     Patient Name: Mario Osullivan Jr.  MRN: [id number]  Today's Date: 2/17/2025    Admit Date: 2/17/2025    Plan: Home with [facility]   Discharge Plan       Row Name 02/17/25 1020       Plan    Plan Home with [facility]    Patient/Family in Agreement with Plan yes    Provided Post Acute Provider List? Yes    Post Acute Provider List Home Health    Delivered To Patient    Method of Delivery Telephone                   Discharge Codes    No documentation.                 Expected Discharge Date and Time       Expected Discharge Date Expected Discharge Time    Feb 17, 2025               Shannon Epley, RN

## 2025-02-17 NOTE — DISCHARGE PLACEMENT REQUEST
"Adalgisa Ortiz Jr. (69 y.o. Male)       Date of Birth   1955    Social Security Number       Address   [address] [place] [id number]    Home Phone   [phone]    MRN   [id number]       [facility]    Marital Status                               Admission Date   2/17/25    Admission Type   Elective    Admitting Provider   Hussain Alexandre MD    Attending Provider   Hussain Alexander MD    Department, Room/Bed   [facility] OSC OR, OSC OR/OSC OR       Discharge Date       Discharge Disposition   Home-Health Care Sv    Discharge Destination                                 Attending Provider: Hussain Alexandre MD    Allergies: No Known Allergies    Isolation: None   Infection: None   Code Status: Not on file    Ht: 182.9 cm (72\")   Wt: 118 kg (261 lb)    Admission Cmt: None   Principal Problem: Primary osteoarthritis of left hip [M16.12]                   Active Insurance as of 2/17/2025       Primary Coverage       Payor Plan Insurance Group Employer/Plan Group    ANTHEM MEDICARE REPLACEMENT ANTHEM MED ADV HMO [id number]       Payor Plan Address Payor Plan Phone Number Payor Plan Fax Number Effective Dates    [address] [phone]  1/1/2025 - None Entered    [facility] [address]         Subscriber Name Subscriber Birth Date Member ID       ADALGISA ORTIZ JR. 1955 [id number]                     Emergency Contacts        (Rel.) Home Phone Work Phone Mobile Phone    ORTIZSTEPH VEGA (Daughter) [phone] -- [phone]    FERNANDO GUO (Son) [phone] -- [phone]                "

## 2025-02-19 ENCOUNTER — TELEPHONE (OUTPATIENT)
Dept: ORTHOPEDIC SURGERY | Facility: HOSPITAL | Age: 70
End: 2025-02-19
Payer: MEDICARE

## 2025-02-19 NOTE — TELEPHONE ENCOUNTER
Post op day 1  Discharge Instructions:  Ask patient about his or her discharge instructions  ?  Patient confirmed understanding   []  Further instruction needed   What, if any, recommendations, teaching, or interventions did you provide? Click or tap here to enter text.  Health status:  Pain controlled Yes   Pain is controlled with medications   Recommended interventions:  Yes  incision/dressing status   ?  Clean without redness, drainage, odor  []  Redness    []  Drainage - color Click or tap here to enter text.  []  Odor  MARIANNE - Green light blinking Yes  Difficulties urination No  Last BM 2/18/2025 (if no BM by day 3-recommend OTC suppository or fleets enema)  Has had BM   Medications:  ?Medications reviewed with patient/family/caregiver  Patient taking medications as prescribed?   Yes  If not taking medications as prescribed, note specific medicine(s) and reason for each:  Click or tap here to enter text.  Hospital Follow Up Plan:  Follow up Appointment with Orthopedic surgeon:  ?Has f/u appointment                []Scheduled f/u appointment  Home Care ordered at discharge?    Yes        Home Care started, or contact made?    Yes   If no, action taken:   DME obtained/used in home?         Yes   Using IS  Choose an item.   Other information: Mr. Francis said he's doing great. He's walking around with the walker and doing his exercises. PT is coming to see him today. He is using the ice. He has some increased pain but its controlled with the pain medication. Dressing looks good. CDI green light blinking. He did have a BM so no issues there. Mr. Osullivan doesn't have any questions for me at this time. He has my contact information should he need anything.

## 2025-03-04 ENCOUNTER — OFFICE VISIT (OUTPATIENT)
Dept: ORTHOPEDIC SURGERY | Facility: CLINIC | Age: 70
End: 2025-03-04
Payer: MEDICARE

## 2025-03-04 ENCOUNTER — TREATMENT (OUTPATIENT)
Age: 70
End: 2025-03-04
Payer: MEDICARE

## 2025-03-04 VITALS — WEIGHT: 258 LBS | HEIGHT: 72 IN | BODY MASS INDEX: 34.95 KG/M2 | TEMPERATURE: 98.7 F

## 2025-03-04 DIAGNOSIS — Z96.642 STATUS POST TOTAL REPLACEMENT OF LEFT HIP: ICD-10-CM

## 2025-03-04 DIAGNOSIS — R52 PAIN: Primary | ICD-10-CM

## 2025-03-04 DIAGNOSIS — M25.552 PAIN IN LEFT HIP: Primary | ICD-10-CM

## 2025-03-04 PROCEDURE — 99024 POSTOP FOLLOW-UP VISIT: CPT | Performed by: ORTHOPAEDIC SURGERY

## 2025-03-04 NOTE — PROGRESS NOTES
Mario Osullivan Jr. : 1955 MRN: [id number] DATE: 3/4/2025    DIAGNOSIS: 2 week follow up left total hip anterior    SUBJECTIVE:Patient returns today for 2 week follow up of left total hip replacement. Patient reports doing well with no unusual complaints. Appears to be progressing appropriately.    OBJECTIVE:   Exam:. The incision is healing appropriately. No sign of infection. Range of motion is progressing as expected. The calf is soft and nontender with a negative Homans sign.    DIAGNOSTIC STUDIES  Xrays: 2 views of the left hip (AP pelvis and lateral left hip) were ordered and reviewed for evaluation of recent hip replacement. They demonstrate a well positioned, well aligned hip replacement without complicating factors noted. In comparison with previous films there has been interval implant placement.    ASSESSMENT: 2 week status post left hip replacement.    PLAN: 1) Staples removed and steri strips applied   2) PT exercises   3) Discontinue JESSIE hose   4) Continue ice PRN   5) WBAT   6) aspirin 81 mg orally every day for 1 month   7) Follow up in 6 weeks with repeat Xrays of left hip (2views)    Hussain Alexandre MD  3/4/2025

## 2025-03-04 NOTE — PROGRESS NOTES
"    Physical Therapy Initial Evaluation and Plan of Care  [address]  [place] [address]  P: [phone]  F: [phone]    Patient: Mario Osullivan Jr.   : 1955  Diagnosis/ICD-10 Code:  Pain in left hip [M25.552]  Referring practitioner: CAYLA Eubanks  Date of Initial Visit: 3/4/2025  Today's Date: 3/4/2025  Patient seen for 1 session         Visit Diagnoses:    ICD-10-CM ICD-9-CM   1. Pain in left hip  M25.552 719.45   2. Status post total replacement of left hip  Z96.642 V43.64         Subjective Questionnaire: LEFS: 34/80      Subjective Evaluation    History of Present Illness  Date of surgery: 2025  Mechanism of injury: Pt is a 68 y/o male who presents to PT s/p L PAKO. Pt ambulates in and out of clinic today without an assistive device safely and independently. Pt reports that his home PT went great and his hip is doing \"really good\". Pt reports he is excited to get back to what he was doing before and has a long term goal of walking a mile. Pt reports he is currently walking a quarter of a mile and it is going well. 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 Pt reports that he is doing really well and that his hip feels much better than before.       Patient Occupation: Retired Pain  Current pain ratin  At worst pain rating: 3  Quality: dull ache  Relieving factors: change in position and rest  Aggravating factors: standing, ambulation, squatting, stairs and lifting  Progression: improved    Social Support  Lives in: one-story house  Lives with: alone    Treatments  Previous treatment: home therapy  Current treatment: physical therapy  Patient Goals  Patient goals for therapy: increased strength, independence with ADLs/IADLs, return to sport/leisure activities, increased motion and decreased pain           Past Medical History:   Diagnosis Date    Anxiety and depression     Bronchitis 2025    RECENTLY TREATED FOR THIS    GERD (gastroesophageal reflux disease)  " "   History of sepsis 2010    OA (osteoarthritis) of hip     LEFT:  PAIN, LIMITED MOBILITY    Sleep apnea     CPAP     Past Surgical History:   Procedure Laterality Date    COLONOSCOPY      CYST REMOVAL      STATES SEVERAL NON CANCEROUS \"TUMORS\" REMOVED    ENDOSCOPY      HEMORRHOIDECTOMY      LASIK Bilateral     PROSTATE BIOPSY      TOTAL HIP ARTHROPLASTY Left 2/17/2025    Procedure: TOTAL HIP ARTHROPLASTY ANTERIOR WITH HANA TABLE;  Surgeon: Hussain Alexandre MD;  Location: [facility];  Service: Orthopedics;  Laterality: Left;         Objective          Neurological Testing     Sensation     Hip   Left Hip   Intact: light touch    Right Hip   Intact: light touch    Active Range of Motion   Left Hip   Flexion: 98 degrees   Extension: WFL  Abduction: WFL  Adduction: WFL  External rotation (90/90): 32 degrees   Internal rotation (90/90): 26 degrees     Right Hip   Normal active range of motion    Passive Range of Motion   Left Hip   Flexion: 102 degrees   External rotation (90/90): 34 degrees   Internal rotation (90/90): 30 degrees     Strength/Myotome Testing     Left Hip   Planes of Motion   Flexion: 4  Extension: 4  Abduction: 4  Adduction: 5    Right Hip   Planes of Motion   Flexion: 5  Extension: 5  Abduction: 5  Adduction: 5    Left Knee   Flexion: 5  Extension: 5    Right Knee   Flexion: 5  Extension: 5    Left Ankle/Foot   Dorsiflexion: 5  Plantar flexion: 5    Right Ankle/Foot   Dorsiflexion: 5  Plantar flexion: 5    Ambulation   Weight-Bearing Status   Weight-Bearing Status (Left): full weight bearing   Weight-Bearing Status (Right): full weight-bearing    Assistive device used: none    Ambulation: Level Surfaces   Ambulation without assistive device: independent    Ambulation: Stairs   Ascend stairs: independent  Pattern: reciprocal  Railings: without rails  Descend stairs: independent  Pattern: reciprocal  Railings: without rails    Observational Gait   Gait: within functional limits   Walking speed, stride " length, left stance time, right stance time, right swing time, left step length and right step length within functional limits.           Assessment & Plan       Assessment  Impairments: abnormal gait, abnormal muscle firing, abnormal or restricted ROM, activity intolerance, impaired balance, impaired physical strength, lacks appropriate home exercise program, pain with function, safety issue and weight-bearing intolerance   Functional limitations: lifting, sleeping, walking, uncomfortable because of pain, moving in bed, sitting, standing and unable to perform repetitive tasks   Assessment details: Pt is a 70 y/o male who presents to physical therapy with signs and symptoms appropriate at this stage s/p L PAKO. Pt has fairly good left hip ROM, with some limitations into left hip flexion, internal rotation, and external rotation. In addition, pt has L LE strength deficits which limits his ability to perform his ADLs/IADLs pain free. Pt has pain and difficulty performing squatting, walking prolonged distances, and standing for prolonged periods of time. Overall, he is doing very well at this stage post op and his gait is looking good as well. He can safely ambulate in and out of clinic without an AD and can go up and down the stairs with good toe clearance and control. His condition is stable in nature and he has multiple co morbidities and personal factors that may affect care. Pt would benefit from skilled physical therapy in order to address the above impairments and activity limitations outlined in this initial evaluation, in order to decrease pain, improve functional mobility, and return to PLOF.   Barriers to therapy: personal factors: age, lives alone  Prognosis: good    Goals  Plan Goals: STG 2-4 weeks    1.LEFS score will be 45/80 to demonstrate subjective improvement in ADLs/IADLs  2. Increase hip flexion ROM to 105 degrees or more in order to be able to improve his ability to lift his leg  3. Pt will be  independent in home exercise program    LTG 4-8 weeks    1. LEFS score will be 55/80 or more to demonstrate subjective improvement in ADLs/IADLs  2. Increase MMT for hip to 5/5 in order to be able to improve strength for walking and going up and down the stairs  3. Pt will be able to squat and  15 lbs from the floor to stand without c/o pain and good lifting mechanics  4. Pt will be able to ambulate 1.5-2.5 mph for 20 minutes with good gait mechanics and without c/o hip pain      Plan  Therapy options: will be seen for skilled therapy services  Planned modality interventions: cryotherapy, electrical stimulation/Russian stimulation, TENS and thermotherapy (hydrocollator packs)  Planned therapy interventions: ADL retraining, balance/weight-bearing training, functional ROM exercises, gait training, home exercise program, IADL retraining, joint mobilization, flexibility, manual therapy, motor coordination training, neuromuscular re-education, soft tissue mobilization, strengthening, stretching, therapeutic activities and transfer training  Frequency: 2x week  Duration in weeks: 8  Treatment plan discussed with: patient  Plan details: 1-2 x a week for 8 weeks            Timed:         Manual Therapy:    0     mins  67917;     Therapeutic Exercise:    10     mins  25487;     Neuromuscular Benny:    0    mins  86973;    Therapeutic Activity:     15     mins  41328;     Gait Trainin     mins  91857;     Ultrasound:     0     mins  64654;    Ionto                               0    mins   55273  Self Care                       0     mins   99579  Canalith Repos    0     mins 29166      Un-Timed:  Electrical Stimulation:    0     mins  98420 (MC );  Dry Needling     0     mins self-pay  Traction     0     mins 72652        Timed Treatment:   25   mins   Total Treatment:     45   mins          PT: Shaylee Nielsen PT     License Number: [id number]  Electronically signed by Shaylee Nielsen PT, 25, 12:02  PM EST    Certification Period: 3/4/2025 thru 6/1/2025  I certify that the therapy services are furnished while this patient is under my care.  The services outlined above are required by this patient, and will be reviewed every 90 days.         Physician Signature:__________________________________________________    PHYSICIAN: Jj Soriano APRN  NPI: [id number]                                            Please sign and return via fax to  [phone]  Thank you, [facility].

## 2025-03-04 NOTE — PATIENT INSTRUCTIONS
Access Code: [email or web address]  URL: [email or web address]  Date: 03/04/2025  Prepared by: Shaylee Nielsen    Exercises  - Clamshell with Resistance  - 1 x daily - 4 x weekly - 3 sets - 10 reps  - Hooklying Single Leg Bent Knee Fallouts with Resistance  - 1 x daily - 4 x weekly - 3 sets - 10 reps  - Mini Squat with Counter Support  - 2 x daily - 4 x weekly - 3 sets - 10 reps

## 2025-03-11 ENCOUNTER — TREATMENT (OUTPATIENT)
Age: 70
End: 2025-03-11
Payer: MEDICARE

## 2025-03-11 DIAGNOSIS — M25.552 PAIN IN LEFT HIP: Primary | ICD-10-CM

## 2025-03-11 DIAGNOSIS — Z96.642 STATUS POST TOTAL REPLACEMENT OF LEFT HIP: ICD-10-CM

## 2025-03-11 NOTE — PROGRESS NOTES
"Physical Therapy Daily Treatment Note  [address]  [facility] [address]  P: [phone]  F: [phone]    Patient: Mario Osullivan Jr.   : 1955  Referring practitioner: CAYLA Eubanks  Date of Initial Visit: Type: THERAPY  Noted: 3/4/2025  Today's Date: 3/11/2025  Patient seen for 2 sessions       Visit Diagnoses:    ICD-10-CM ICD-9-CM   1. Pain in left hip  M25.552 719.45   2. Status post total replacement of left hip  Z96.642 V43.64       Mario Osullivan Jr. reports:     Subjective   Pt reports that he is \"doing good\" \"but I think I overdid it over the weekend with walking and stuff, a little sore\". Pt reports that he walked every day over the weekend. Pt denies pain, only muscle soreness.   Objective   See Exercise, Manual, and Modality Logs for complete treatment.       Assessment/Plan  Pt tolerated therapeutic interventions well without adverse reactions. Reviewed home exercise program and he demonstrates good understanding and compliance with performing. Pt requires verbal cueing during resisted side stepping to 'keep toes straight ahead' to prevent R hip ER compensation when stepping laterally. Pt does well with cue correcting form. He ambulates in and out of clinic without an assistive device safely and independently. Discussed with pt to gradually increase walking, but to take breaks as needed. Pt voices understanding. Should continue to benefit from PT. Continue per POC.     Timed:         Manual Therapy:    0     mins  36701;     Therapeutic Exercise:    25     mins  47627;     Neuromuscular Benny:    0    mins  92319;    Therapeutic Activity:     14     mins  06979;     Gait Trainin     mins  45625;     Ultrasound:     0     mins  76892;    Ionto                               0    mins   92192  Self Care                       0     mins   49088  Canalith Repos    0     mins 41711      Un-Timed:  Electrical Stimulation:    0     mins  26218 ( );  Dry " Needling     0     mins self-pay  Traction     0     mins 18034      Timed Treatment:   39   mins   Total Treatment:     39   mins    Shaylee Nielsen, PT  [place] License: [id number]

## 2025-03-17 ENCOUNTER — TELEPHONE (OUTPATIENT)
Dept: ORTHOPEDIC SURGERY | Facility: HOSPITAL | Age: 70
End: 2025-03-17
Payer: MEDICARE

## 2025-03-17 NOTE — TELEPHONE ENCOUNTER
Mr. Osullivan called me back and left a message He said things are going great. He is working with PT and they said he is about 3 weeks ahead of schedule. He feels that surgery was an overwhelming success. Mr. Osullivan doesn't have any questions/concerns for me at this time. He has my contact information should he need anything.

## 2025-03-17 NOTE — TELEPHONE ENCOUNTER
Attempted to reach Mr. Osullivan to see how he has been doing since his LTK 2/17. Message left at this time.

## 2025-03-18 ENCOUNTER — TREATMENT (OUTPATIENT)
Age: 70
End: 2025-03-18
Payer: MEDICARE

## 2025-03-18 DIAGNOSIS — M25.552 PAIN IN LEFT HIP: Primary | ICD-10-CM

## 2025-03-18 DIAGNOSIS — Z96.642 STATUS POST TOTAL REPLACEMENT OF LEFT HIP: ICD-10-CM

## 2025-03-18 NOTE — PROGRESS NOTES
"Physical Therapy Daily Treatment Note  [address]  [facility] [address]  P: [phone]  F: [phone]    Patient: Mario Osullivan Jr.   : 1955  Referring practitioner: CAYLA Eubanks  Date of Initial Visit: Type: THERAPY  Noted: 3/4/2025  Today's Date: 3/18/2025  Patient seen for 3 sessions       Visit Diagnoses:    ICD-10-CM ICD-9-CM   1. Pain in left hip  M25.552 719.45   2. Status post total replacement of left hip  Z96.642 V43.64       Mario Osullivan Jr. reports:     Subjective   Pt reports that his hip is doing \"good\", but that his left knee is sore and states \"I don't know if that since the hip I am walking differently or what, but it's a little sore\".   Objective   See Exercise, Manual, and Modality Logs for complete treatment.   Progressed: lv 5 Nustep    Added: single limb stance, standing hip abduction (ylw loop), LAQ 2 lb  Assessment/Plan  Pt has excellent form with mini squat with good hip hinge and not requiring any verbal cueing for proper exercise performance. In addition, he is able to balance on his left limb only for 10 seconds without LOB. Continues to benefit from verbal cueing during remainder of therapeutic interventions to ensure no compensatory muscle movements. Updated home exercise program to include resisted side stepping, resisted standing hip abduction, and single leg stance. All with counter support for safety. Pt issued yellow resistance loop to use for home. Pt can perform all in clinic well without c/o left hip pain. C/o slight knee pain in quad on left side with forward lunges when extending knee, would benefit from continued quad strengthening to help improve this and decrease knee discomfort. During straight leg raise, needs v/c to increase quad activation prior to movement. He should continue to improve with PT. Continue per POC.     Timed:         Manual Therapy:    0     mins  20213;     Therapeutic Exercise:    25     mins  64236;   "   Neuromuscular Benny:    0    mins  89563;    Therapeutic Activity:     15     mins  94294;     Gait Trainin     mins  51902;     Ultrasound:     0     mins  34361;    Ionto                               0    mins   29315  Self Care                       0     mins   55974  Canalith Repos    0     mins 92733      Un-Timed:  Electrical Stimulation:    0     mins  63186 ( );  Dry Needling     0     mins self-pay  Traction     0     mins 54962      Timed Treatment:   40   mins   Total Treatment:     40   mins    Shaylee Nielsen, PT  [place] License: [id number]

## 2025-03-20 ENCOUNTER — TREATMENT (OUTPATIENT)
Age: 70
End: 2025-03-20
Payer: MEDICARE

## 2025-03-20 DIAGNOSIS — M25.552 PAIN IN LEFT HIP: Primary | ICD-10-CM

## 2025-03-20 DIAGNOSIS — Z96.642 STATUS POST TOTAL REPLACEMENT OF LEFT HIP: ICD-10-CM

## 2025-03-20 NOTE — PROGRESS NOTES
"Physical Therapy Daily Treatment Note  [address]  [facility] [address]  P: [phone]  F: [phone]    Patient: Mario Osullivan Jr.   : 1955  Referring practitioner: CAYLA Eubanks  Date of Initial Visit: Type: THERAPY  Noted: 3/4/2025  Today's Date: 3/20/2025  Patient seen for 4 sessions       Visit Diagnoses:    ICD-10-CM ICD-9-CM   1. Pain in left hip  M25.552 719.45   2. Status post total replacement of left hip  Z96.642 V43.64       Mario Osullivan Jr. reports:     Subjective   Pt reports that he is \"sore this morning\". He reports yesterday he was working on his tractor and getting on/off the step was hard. Pt reports that he is fine \"just sore\".   Objective   See Exercise, Manual, and Modality Logs for complete treatment.   Progressed: mini squats (orange), bridge with 5 lb kb    Added: leg press (lightweight), step up fwd 6 inch with kb 5 lb, standing hip flex (ylw), s/l hip adduction, LAQ with hold, sit to stand 5 lb  Assessment/Plan  Pt did well with progressed therapeutic interventions and with new additional interventions with no adverse reactions in his hip. No c/o of increased soreness with new exercises. Still needs improvement in his left quad activation. Can perform SLR without quad lag, but not to full range to opposite hook lying knee height. He can do this to full range, but cannot maintain quad activation and has lag. Performs correctly about 50% of range. Continues to ambulate well in and out of clinic without AD. Discussed with pt to be cautious with activities (I.e. stepping on and off tractor) due to just having surgery and to ensure jt protection and safety. Pt voices understanding. Continue PT per POC.     Timed:         Manual Therapy:    0     mins  99824;     Therapeutic Exercise:    24     mins  02531;     Neuromuscular Benny:    0    mins  96296;    Therapeutic Activity:     16     mins  88104;     Gait Trainin     mins  18154;   "   Ultrasound:     0     mins  62969;    Ionto                               0    mins   57092  Self Care                       0     mins   42504  Canalith Repos    0     mins 26027      Un-Timed:  Electrical Stimulation:    0     mins  34788 ( );  Dry Needling     0     mins self-pay  Traction     0     mins 69515      Timed Treatment:   40   mins   Total Treatment:     40   mins    Shaylee Nielsen, PT  [place] License: [id number]

## 2025-03-25 ENCOUNTER — TREATMENT (OUTPATIENT)
Age: 70
End: 2025-03-25
Payer: MEDICARE

## 2025-03-25 DIAGNOSIS — M25.552 PAIN IN LEFT HIP: Primary | ICD-10-CM

## 2025-03-25 DIAGNOSIS — Z96.642 STATUS POST TOTAL REPLACEMENT OF LEFT HIP: ICD-10-CM

## 2025-03-25 PROCEDURE — 97164 PT RE-EVAL EST PLAN CARE: CPT | Performed by: PHYSICAL THERAPIST

## 2025-03-25 PROCEDURE — 97530 THERAPEUTIC ACTIVITIES: CPT | Performed by: PHYSICAL THERAPIST

## 2025-03-25 PROCEDURE — 97110 THERAPEUTIC EXERCISES: CPT | Performed by: PHYSICAL THERAPIST

## 2025-03-25 NOTE — PROGRESS NOTES
"Re-Assessment / Progress Note / Discharge Summary  [address]  [address]  [facility] [address]  P: [phone]  F: [phone]  Patient: Mario Osullivan .   : 1955  Diagnosis/ICD-10 Code:  Pain in left hip [M25.552]  Referring practitioner: CAYLA Eubanks  Date of Initial Visit: Episode Type: THERAPY  Noted: 3/4/2025    Today's Date: 3/25/2025  Patient seen for 5 sessions.    Visit Diagnoses:    ICD-10-CM ICD-9-CM   1. Pain in left hip  M25.552 719.45   2. Status post total replacement of left hip  Z96.642 V43.64       Subjective:   Mario Osullivan reports:     Subjective Questionnaire: LEFS: 60/80  Clinical Progress: improved  Home Program Compliance: Yes  Treatment has included: therapeutic exercise, neuromuscular re-education, manual therapy, therapeutic activity, and gait training    Subjective   Pt reports his hip is feeling \"good\" and that he can walk a mile well in about 20 minutes.   Current pain ratin  At worst pain ratin  Objective   Active Range of Motion   Left Hip   Flexion: 105 degrees   Extension: WFL  Abduction: WFL  Adduction: WFL  External rotation (90/90): 38 degrees   Internal rotation (90/90): 35 degrees     Strength/Myotome Testing      Left Hip   Planes of Motion   Flexion: 5  Extension: 4+  Abduction: 4+  Adduction: 5     Right Hip   Planes of Motion   Flexion: 5  Extension: 5  Abduction: 5  Adduction: 5     Left Knee   Flexion: 5  Extension: 5     Right Knee   Flexion: 5  Extension: 5     Left Ankle/Foot   Dorsiflexion: 5  Plantar flexion: 5     Right Ankle/Foot   Dorsiflexion: 5  Plantar flexion: 5     Ambulation   Weight-Bearing Status   Weight-Bearing Status (Left): full weight bearing   Weight-Bearing Status (Right): full weight-bearing    Assistive device used: none     Functional task:  Can lift 15 lbs from floor to waist to floor with good lifting mechanics no c/o pain or difficulty      Assessment/Plan  Pt is a 68 y/o male who has been attending " physical therapy for 5 sessions s/p L PAKO. Pt has made excellent progress in his overall strength and functional mobility since initial evaluation. He ambulates safely and independently for household and community ambulation distances without c/o hip pain or problems. He states he walks 20 minutes at home without an issue. Pt can lift 15 lbs from the floor with good lifting mechanics at knees, hips, and back. Pt can go up/down the stairs alternating lower extremities independently. 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 He reports independence in his dressing/bathing tasks. He states he thinks he is ready to just do his exercises at home and due to good progress in PT he is ready for discharge from formal PT at this time. Pt instructed to continue to perform his home exercise program to maintain progress made in therapy. He voices understanding. He is independent and very compliant with his HEP. Pt discharge prognosis is good, he will follow up with me in the future if needed.   Progress toward previous goals: Partially Met    Goals  Plan Goals: STG 2-4 weeks     1.LEFS score will be 45/80 to demonstrate subjective improvement in ADLs/IADLs MET  2. Increase hip flexion ROM to 105 degrees or more in order to be able to improve his ability to lift his leg MET  3. Pt will be independent in home exercise program MET     LTG 4-8 weeks     1. LEFS score will be 55/80 or more to demonstrate subjective improvement in ADLs/IADLs MET  2. Increase MMT for hip to 5/5 in order to be able to improve strength for walking and going up and down the stairs progressing  3. Pt will be able to squat and  15 lbs from the floor to stand without c/o pain and good lifting mechanics MET  4. Pt will be able to ambulate 1.5-2.5 mph for 20 minutes with good gait mechanics and without c/o hip pain progressing (DNT in clinic, pt reports walking 20 minutes at home)        Recommendations: Discharge  Timeframe:  today      PT Signature: Shaylee Nielsen, PT  [place] Lic. # [id number]        [facility] PHYSICAL THERAPY  [address]  [facility] [address] [phone] ; Fax Number [phone]    Signature: __________________________________  Jj Soriano APRN    Manual Therapy:     0     mins  72847;  Therapeutic Exercise:     8     mins  91791;     Neuromuscular Benny:     0    mins  23850;    Therapeutic Activity:      17     mins  44348;     Gait Trainin     mins  73622;     Ultrasound:      0     mins  50705;    Electrical Stimulation:     0     mins  09682 ( );  Dry Needling      0     mins self-pay  Traction      0     mins 20258  Canalith Repositioning    0     mins 50726  Re-evaluation                             8         mins  20021      Timed Treatment:   25   mins   Total Treatment:     33   mins

## 2025-04-15 ENCOUNTER — OFFICE VISIT (OUTPATIENT)
Dept: ORTHOPEDIC SURGERY | Facility: CLINIC | Age: 70
End: 2025-04-15
Payer: MEDICARE

## 2025-04-15 VITALS — WEIGHT: 259.4 LBS | BODY MASS INDEX: 35.13 KG/M2 | TEMPERATURE: 97.1 F | HEIGHT: 72 IN

## 2025-04-15 DIAGNOSIS — R52 PAIN: Primary | ICD-10-CM

## 2025-04-15 DIAGNOSIS — Z96.642 STATUS POST TOTAL HIP REPLACEMENT, LEFT: ICD-10-CM

## 2025-04-15 PROCEDURE — 73502 X-RAY EXAM HIP UNI 2-3 VIEWS: CPT | Performed by: NURSE PRACTITIONER

## 2025-04-15 PROCEDURE — 99024 POSTOP FOLLOW-UP VISIT: CPT | Performed by: NURSE PRACTITIONER

## 2025-04-15 RX ORDER — CEPHALEXIN 500 MG/1
CAPSULE ORAL
Qty: 4 CAPSULE | Refills: 5 | Status: SHIPPED | OUTPATIENT
Start: 2025-04-15

## 2025-04-15 NOTE — PROGRESS NOTES
Mario Osullivan Jr. : 1955 MRN: [id number] DATE: 4/15/2025    DIAGNOSIS: 8 week follow up left total hip Anterior Approach    SUBJECTIVE:Patient returns today for 8 week follow up of left total hip replacement. Patient reports doing well with no unusual complaints. Reports his pain level is very minimal and rates it as a 1 on a scale of 10. States he is finished with outpatient PT  and now doing home exercises. Appears to be progressing appropriately and is off a cane.  Denies any instability or buckling issues.  Denies any signs or symptoms of infection, and is without any other significant complaints today.    OBJECTIVE:   Exam:. The incision is healed. No sign of infection. Range of motion is progressing as expected. The calf is soft and nontender with a negative Homans sign. Strength progressing    DIAGNOSTIC STUDIES  Xrays: 2 views of the left hip (AP pelvis and lateral left hip) were ordered and reviewed for evaluation of recent hip replacement. They demonstrate a well positioned, well aligned hip replacement without complicating factors noted. In comparison with previous films there has been interval implant placement.    ASSESSMENT: 8 week status post left hip replacement Anterior Approach    PLAN: 1) Activity as tolerated   2) Continue hip strengthening exercises    3) Follow up 1 year post-op with repeat Xrays of left hip (2views AP Pelvis and lateral left hip)    CAYLA Eubanks  4/15/2025  
Stable

## 2025-06-26 NOTE — PROGRESS NOTES
Patient:Mario Osullivan Jr.    YOB: 1955    Medical Record Number:[id number]    Chief Complaints: Referral for right shoulder pain    History of Present Illness:     69 y.o. male patient who presents for his right shoulder.  He is referred by Jj Soriano.  The patient tells me that his right shoulder has bothered him for about 4 or 5 years.  He does not recall any specific inciting event or factor.  Pain is moderate, constant and aching.  The pain is worse with reaching and lifting.  He takes meloxicam which does seem to help somewhat.      Allergies:No Known Allergies    Home Medications:    Current Outpatient Medications:   •  ALPRAZolam (XANAX) 1 MG tablet, Take 1 tablet by mouth 2 (Two) Times a Day., Disp: , Rfl:   •  buPROPion XL (WELLBUTRIN XL) 150 MG 24 hr tablet, Take 1 tablet by mouth Every Morning., Disp: , Rfl:   •  cephalexin (KEFLEX) 500 MG capsule, Take all 4 capsules one hour prior to procedure, Disp: 4 capsule, Rfl: 5  •  meloxicam (MOBIC) 15 MG tablet, Take 1 tablet by mouth Daily. (Patient not taking: Reported on 4/15/2025), Disp: 14 tablet, Rfl: 0  •  ondansetron (Zofran) 4 MG tablet, Take 1 tablet by mouth Every 8 (Eight) Hours As Needed for Nausea or Vomiting for up to 10 doses. (Patient not taking: Reported on 4/15/2025), Disp: 10 tablet, Rfl: 0  •  oxyCODONE-acetaminophen (PERCOCET)  MG per tablet, Take 1 tablet by mouth Every 4 (Four) Hours As Needed for Moderate Pain for up to 30 doses., Disp: 30 tablet, Rfl: 0  •  raNITIdine (ZANTAC) 150 MG tablet, Take 1 tablet by mouth As Needed., Disp: , Rfl:   •  Sildenafil Citrate (VIAGRA PO), Take  by mouth As Needed. HOLD 72 HOURS PRIOR TO SURGERY, Disp: , Rfl:     Past Medical History:   Diagnosis Date   • Anxiety and depression    • Bronchitis 02/03/2025    RECENTLY TREATED FOR THIS   • GERD (gastroesophageal reflux disease)    • History of sepsis 2010   • OA (osteoarthritis) of hip     LEFT:  PAIN, LIMITED MOBILITY   • Sleep  "apnea     CPAP       Past Surgical History:   Procedure Laterality Date   • COLONOSCOPY     • CYST REMOVAL      STATES SEVERAL NON CANCEROUS \"TUMORS\" REMOVED   • ENDOSCOPY     • HEMORRHOIDECTOMY     • LASIK Bilateral    • PROSTATE BIOPSY     • TOTAL HIP ARTHROPLASTY Left 2/17/2025    Procedure: TOTAL HIP ARTHROPLASTY ANTERIOR WITH HANA TABLE;  Surgeon: Hussain Alexandre MD;  [place] [facility] [place] [facility] [place]  Service: [place]opedics;  Laterality: Left;       Social History     Occupational History   • Not on file   Tobacco Use   • Smoking status: Former     Types: Cigarettes     Passive exposure: Past   • Smokeless tobacco: Never   • Tobacco comments:     QUIT 40 YEARS AGO   Vaping Use   • Vaping status: Never Used   Substance and Sexual Activity   • Alcohol use: Yes     Comment: rare   • Drug use: Never   • Sexual activity: Defer      Social History     Social History Narrative   • Not on file       Family History   Problem Relation Age of Onset   • Cancer Mother    • Cancer Father    • Malig Hyperthermia Neg Hx      Review of Systems:      Constitutional: Denies fever, shaking or chills   Eyes: Denies change in visual acuity   HEENT: Denies nasal congestion or sore throat   Respiratory: Denies cough or shortness of breath   Cardiovascular: Denies chest pain or edema  Endocrine: Denies tremors, palpitations, intolerance of heat or cold, polyuria, polydipsia.  GI: Denies abdominal pain, nausea, vomiting, bloody stools or diarrhea  : Denies frequency, urgency, incontinence, retention, or nocturia.  Musculoskeletal: Denies numbness, tingling or loss of motor function except as above  Integument: Denies rash, lesion or ulceration   Neurologic: Denies headache or focal weakness, deficits  Heme: Denies spontaneous or excessive bleeding, epistaxis, hematuria, melena, fatigue, enlarged or tender lymph nodes.      All other pertinent positives and negatives as noted above in HPI.    Physical Exam:69 y.o. male    Vitals:    06/27/25 1350 "   Temp: 98 °F (36.7 °C)   TempSrc: Temporal   Weight: 119 kg (263 lb 4.8 oz)     General:  Patient is awake and alert.  Appears in no acute distress or discomfort.    Psych:  Affect and demeanor are appropriate.    Extremities: Right shoulder is examined.  Skin is benign.  Mild tenderness anteriorly.  No effusion.  Motion is a functional but somewhat limited: Forward elevation 150, external rotation 40, internal rotation to roughly L2.  Good strength with shoulder abduction, forward elevation, internal and external rotation.  No significant pain with resistive testing of his strength.  Intact motor and sensory function in his hand.  Brisk capillary refill.    Imaging: AP, scapular Y and x-ray views right shoulder are ordered and reviewed.  No comparison films are available.  The x-rays show glenohumeral osteoarthritis with large osteophyte formation and bone-on-bone on the axillary view.  He appears to have some central glenoid erosion.    Assessment/Plan:  Right shoulder osteoarthritis    I showed him the x-rays.  We discussed the natural history of this condition.  We thoroughly discussed his options.  He wanted to try an injection today.  The risk, benefits and alternatives were discussed.  He consented and the procedure was performed as described below.  He will follow-up as needed.  Large Joint Arthrocentesis: R glenohumeral  Date/Time: 6/27/2025 2:07 PM  Consent given by: patient  Site marked: site marked  Timeout: Immediately prior to procedure a time out was called to verify the correct patient, procedure, equipment, support staff and site/side marked as required   Supporting Documentation  Indications: pain   Procedure Details  Location: shoulder - R glenohumeral  Preparation: Patient was prepped and draped in the usual sterile fashion  Needle gauge: 21 G.  Approach: anterior  Medications administered: 2 mL lidocaine PF 1% 1 %; 80 mg methylPREDNISolone acetate 40 MG/ML  Patient tolerance: patient tolerated  the procedure well with no immediate complications          Luis Pope MD    06/27/2025

## 2025-06-27 ENCOUNTER — OFFICE VISIT (OUTPATIENT)
Dept: ORTHOPEDIC SURGERY | Facility: CLINIC | Age: 70
End: 2025-06-27
Payer: MEDICARE

## 2025-06-27 VITALS — WEIGHT: 263.3 LBS | BODY MASS INDEX: 35.71 KG/M2 | TEMPERATURE: 98 F

## 2025-06-27 DIAGNOSIS — R52 PAIN: Primary | ICD-10-CM

## 2025-06-27 DIAGNOSIS — M19.011 ARTHRITIS OF RIGHT SHOULDER: ICD-10-CM

## 2025-06-27 RX ORDER — METHYLPREDNISOLONE ACETATE 40 MG/ML
80 INJECTION, SUSPENSION INTRA-ARTICULAR; INTRALESIONAL; INTRAMUSCULAR; SOFT TISSUE
Status: COMPLETED | OUTPATIENT
Start: 2025-06-27 | End: 2025-06-27

## 2025-06-27 RX ORDER — LIDOCAINE HYDROCHLORIDE 10 MG/ML
2 INJECTION, SOLUTION EPIDURAL; INFILTRATION; INTRACAUDAL; PERINEURAL
Status: COMPLETED | OUTPATIENT
Start: 2025-06-27 | End: 2025-06-27

## 2025-06-27 RX ADMIN — LIDOCAINE HYDROCHLORIDE 2 ML: 10 INJECTION, SOLUTION EPIDURAL; INFILTRATION; INTRACAUDAL; PERINEURAL at 14:07

## 2025-06-27 RX ADMIN — METHYLPREDNISOLONE ACETATE 80 MG: 40 INJECTION, SUSPENSION INTRA-ARTICULAR; INTRALESIONAL; INTRAMUSCULAR; SOFT TISSUE at 14:07

## (undated) DEVICE — PATIENT RETURN ELECTRODE, SINGLE-USE, CONTACT QUALITY MONITORING, ADULT, WITH 9FT CORD, FOR PATIENTS WEIGING OVER 33LBS. (15KG): Brand: MEGADYNE

## (undated) DEVICE — PREP SOL POVIDONE/IODINE BT 4OZ

## (undated) DEVICE — QUINCKE POINT SPINAL NEEDLE, BLACK: Brand: RELI

## (undated) DEVICE — ANTIBACTERIAL UNDYED BRAIDED (POLYGLACTIN 910), SYNTHETIC ABSORBABLE SUTURE: Brand: COATED VICRYL

## (undated) DEVICE — DECANTER BAG 9": Brand: MEDLINE INDUSTRIES, INC.

## (undated) DEVICE — TOWEL,OR,DSP,ST,BLUE,STD,4/PK,20PK/CS: Brand: MEDLINE

## (undated) DEVICE — PENCL SMOKE/EVAC MEGADYNE TELESCP 10FT

## (undated) DEVICE — SUT PDS 1 CT1 36IN Z347H

## (undated) DEVICE — GLV SURG SENSICARE W/ALOE PF LF 8 STRL

## (undated) DEVICE — GLV SURG SENSICARE W/ALOE PF LF 7.5 STRL

## (undated) DEVICE — SUT VIC PLS UD BR COAT ANTIB ABS CT1 SZ1 36MM 27IN VCPP40D

## (undated) DEVICE — 450 ML BOTTLE OF 0.05% CHLORHEXIDINE GLUCONATE IN 99.95% STERILE WATER FOR IRRIGATION, USP AND APPLICATOR.: Brand: IRRISEPT ANTIMICROBIAL WOUND LAVAGE

## (undated) DEVICE — PK ANT HIP 40

## (undated) DEVICE — UNDERGLV SURG BIOGEL INDICATOR LF PF 7.5

## (undated) DEVICE — GLV SURG BIOGEL M LTX PF 7 1/2

## (undated) DEVICE — 3M™ IOBAN™ 2 ANTIMICROBIAL INCISE DRAPE 6640EZ: Brand: IOBAN™ 2

## (undated) DEVICE — THE STERILE LIGHT HANDLE COVER IS USED WITH STERIS SURGICAL LIGHTING AND VISUALIZATION SYSTEMS.

## (undated) DEVICE — APPL DURAPREP IODOPHOR APL 26ML

## (undated) DEVICE — SYS SKIN EXOFIN WND CLS 4X22CM

## (undated) DEVICE — WEREWOLF FASTSEAL 6.0 HEMOSTASIS WAND: Brand: FASTSEAL 6.0 HEMOSTASIS WAND

## (undated) DEVICE — GLV SURG SENSICARE PI MIC PF SZ7 LF STRL

## (undated) DEVICE — SPONGE,LAP,18"X18",DLX,XR,ST,5/PK,40/PK: Brand: MEDLINE

## (undated) DEVICE — DUAL CUT SAGITTAL BLADE

## (undated) DEVICE — GLV SURG SENSICARE PI PF LF 7 GRN STRL

## (undated) DEVICE — SKIN PREP TRAY 4 COMPARTM TRAY: Brand: MEDLINE INDUSTRIES, INC.

## (undated) DEVICE — STPLR SKIN VISISTAT WD 35CT

## (undated) DEVICE — TRAP FLD MINIVAC MEGADYNE 100ML